# Patient Record
Sex: FEMALE | Race: OTHER | Employment: UNEMPLOYED | URBAN - METROPOLITAN AREA
[De-identification: names, ages, dates, MRNs, and addresses within clinical notes are randomized per-mention and may not be internally consistent; named-entity substitution may affect disease eponyms.]

---

## 2017-04-17 PROCEDURE — 96365 THER/PROPH/DIAG IV INF INIT: CPT

## 2017-04-17 PROCEDURE — 99283 EMERGENCY DEPT VISIT LOW MDM: CPT

## 2017-04-17 PROCEDURE — 81001 URINALYSIS AUTO W/SCOPE: CPT | Performed by: EMERGENCY MEDICINE

## 2017-04-17 PROCEDURE — 96375 TX/PRO/DX INJ NEW DRUG ADDON: CPT

## 2017-04-17 PROCEDURE — 81025 URINE PREGNANCY TEST: CPT

## 2017-04-18 ENCOUNTER — HOSPITAL ENCOUNTER (EMERGENCY)
Age: 22
Discharge: HOME | End: 2017-04-18
Attending: EMERGENCY MEDICINE
Payer: SELF-PAY

## 2017-04-18 ENCOUNTER — APPOINTMENT (OUTPATIENT)
Dept: CT IMAGING | Age: 22
End: 2017-04-18
Attending: PHYSICIAN ASSISTANT
Payer: SELF-PAY

## 2017-04-18 VITALS
RESPIRATION RATE: 16 BRPM | WEIGHT: 191 LBS | HEART RATE: 80 BPM | TEMPERATURE: 98.6 F | OXYGEN SATURATION: 100 % | BODY MASS INDEX: 32.61 KG/M2 | DIASTOLIC BLOOD PRESSURE: 52 MMHG | SYSTOLIC BLOOD PRESSURE: 101 MMHG | HEIGHT: 64 IN

## 2017-04-18 DIAGNOSIS — N23 RENAL COLIC: Primary | ICD-10-CM

## 2017-04-18 DIAGNOSIS — N39.0 URINARY TRACT INFECTION WITHOUT HEMATURIA, SITE UNSPECIFIED: ICD-10-CM

## 2017-04-18 LAB
ALBUMIN SERPL BCP-MCNC: 3.9 G/DL (ref 3.5–5)
ALBUMIN/GLOB SERPL: 1 {RATIO} (ref 1.1–2.2)
ALP SERPL-CCNC: 87 U/L (ref 45–117)
ALT SERPL-CCNC: 30 U/L (ref 12–78)
ANION GAP BLD CALC-SCNC: 8 MMOL/L (ref 5–15)
APPEARANCE UR: ABNORMAL
AST SERPL W P-5'-P-CCNC: 15 U/L (ref 15–37)
BACTERIA URNS QL MICRO: NEGATIVE /HPF
BASOPHILS # BLD AUTO: 0 K/UL (ref 0–0.1)
BASOPHILS # BLD: 0 % (ref 0–1)
BILIRUB SERPL-MCNC: 0.5 MG/DL (ref 0.2–1)
BILIRUB UR QL: NEGATIVE
BUN SERPL-MCNC: 4 MG/DL (ref 6–20)
BUN/CREAT SERPL: 7 (ref 12–20)
CALCIUM SERPL-MCNC: 8.5 MG/DL (ref 8.5–10.1)
CHLORIDE SERPL-SCNC: 104 MMOL/L (ref 97–108)
CO2 SERPL-SCNC: 27 MMOL/L (ref 21–32)
COLOR UR: ABNORMAL
CREAT SERPL-MCNC: 0.61 MG/DL (ref 0.55–1.02)
EOSINOPHIL # BLD: 0.3 K/UL (ref 0–0.4)
EOSINOPHIL NFR BLD: 3 % (ref 0–7)
EPITH CASTS URNS QL MICRO: ABNORMAL /LPF
ERYTHROCYTE [DISTWIDTH] IN BLOOD BY AUTOMATED COUNT: 13.7 % (ref 11.5–14.5)
GLOBULIN SER CALC-MCNC: 4.1 G/DL (ref 2–4)
GLUCOSE SERPL-MCNC: 84 MG/DL (ref 65–100)
GLUCOSE UR STRIP.AUTO-MCNC: NEGATIVE MG/DL
HCG UR QL: NEGATIVE
HCT VFR BLD AUTO: 39.7 % (ref 35–47)
HGB BLD-MCNC: 13.1 G/DL (ref 11.5–16)
HGB UR QL STRIP: NEGATIVE
HYALINE CASTS URNS QL MICRO: ABNORMAL /LPF (ref 0–5)
KETONES UR QL STRIP.AUTO: NEGATIVE MG/DL
LACTATE SERPL-SCNC: 1 MMOL/L (ref 0.4–2)
LEUKOCYTE ESTERASE UR QL STRIP.AUTO: ABNORMAL
LYMPHOCYTES # BLD AUTO: 22 % (ref 12–49)
LYMPHOCYTES # BLD: 2.1 K/UL (ref 0.8–3.5)
MCH RBC QN AUTO: 29.6 PG (ref 26–34)
MCHC RBC AUTO-ENTMCNC: 33 G/DL (ref 30–36.5)
MCV RBC AUTO: 89.6 FL (ref 80–99)
MONOCYTES # BLD: 1 K/UL (ref 0–1)
MONOCYTES NFR BLD AUTO: 11 % (ref 5–13)
NEUTS SEG # BLD: 6 K/UL (ref 1.8–8)
NEUTS SEG NFR BLD AUTO: 64 % (ref 32–75)
NITRITE UR QL STRIP.AUTO: NEGATIVE
PH UR STRIP: 6.5 [PH] (ref 5–8)
PLATELET # BLD AUTO: 250 K/UL (ref 150–400)
POTASSIUM SERPL-SCNC: 3.5 MMOL/L (ref 3.5–5.1)
PROT SERPL-MCNC: 8 G/DL (ref 6.4–8.2)
PROT UR STRIP-MCNC: NEGATIVE MG/DL
RBC # BLD AUTO: 4.43 M/UL (ref 3.8–5.2)
RBC #/AREA URNS HPF: ABNORMAL /HPF (ref 0–5)
SODIUM SERPL-SCNC: 139 MMOL/L (ref 136–145)
SP GR UR REFRACTOMETRY: 1.01 (ref 1–1.03)
UROBILINOGEN UR QL STRIP.AUTO: 0.2 EU/DL (ref 0.2–1)
WBC # BLD AUTO: 9.6 K/UL (ref 3.6–11)
WBC URNS QL MICRO: ABNORMAL /HPF (ref 0–4)

## 2017-04-18 PROCEDURE — 80053 COMPREHEN METABOLIC PANEL: CPT | Performed by: PHYSICIAN ASSISTANT

## 2017-04-18 PROCEDURE — 74011250636 HC RX REV CODE- 250/636: Performed by: PHYSICIAN ASSISTANT

## 2017-04-18 PROCEDURE — 74176 CT ABD & PELVIS W/O CONTRAST: CPT

## 2017-04-18 PROCEDURE — 85025 COMPLETE CBC W/AUTO DIFF WBC: CPT | Performed by: PHYSICIAN ASSISTANT

## 2017-04-18 PROCEDURE — 74011000258 HC RX REV CODE- 258: Performed by: PHYSICIAN ASSISTANT

## 2017-04-18 PROCEDURE — 74011250637 HC RX REV CODE- 250/637: Performed by: PHYSICIAN ASSISTANT

## 2017-04-18 PROCEDURE — 36415 COLL VENOUS BLD VENIPUNCTURE: CPT | Performed by: PHYSICIAN ASSISTANT

## 2017-04-18 PROCEDURE — 83605 ASSAY OF LACTIC ACID: CPT | Performed by: PHYSICIAN ASSISTANT

## 2017-04-18 RX ORDER — NAPROXEN 500 MG/1
500 TABLET ORAL
Qty: 20 TAB | Refills: 0 | Status: SHIPPED | OUTPATIENT
Start: 2017-04-18 | End: 2019-02-04

## 2017-04-18 RX ORDER — TRAMADOL HYDROCHLORIDE 50 MG/1
50 TABLET ORAL
Qty: 12 TAB | Refills: 0 | Status: SHIPPED | OUTPATIENT
Start: 2017-04-18 | End: 2017-04-28

## 2017-04-18 RX ORDER — CEPHALEXIN 500 MG/1
500 CAPSULE ORAL 2 TIMES DAILY
Qty: 14 CAP | Refills: 0 | Status: SHIPPED | OUTPATIENT
Start: 2017-04-18 | End: 2017-04-25

## 2017-04-18 RX ORDER — KETOROLAC TROMETHAMINE 30 MG/ML
30 INJECTION, SOLUTION INTRAMUSCULAR; INTRAVENOUS
Status: COMPLETED | OUTPATIENT
Start: 2017-04-18 | End: 2017-04-18

## 2017-04-18 RX ORDER — HYDROCODONE BITARTRATE AND ACETAMINOPHEN 7.5; 325 MG/1; MG/1
1 TABLET ORAL
Status: COMPLETED | OUTPATIENT
Start: 2017-04-18 | End: 2017-04-18

## 2017-04-18 RX ORDER — ONDANSETRON 2 MG/ML
4 INJECTION INTRAMUSCULAR; INTRAVENOUS
Status: COMPLETED | OUTPATIENT
Start: 2017-04-18 | End: 2017-04-18

## 2017-04-18 RX ADMIN — CEFTRIAXONE 1 G: 1 INJECTION, POWDER, FOR SOLUTION INTRAMUSCULAR; INTRAVENOUS at 01:44

## 2017-04-18 RX ADMIN — SODIUM CHLORIDE 1000 ML: 900 INJECTION, SOLUTION INTRAVENOUS at 01:43

## 2017-04-18 RX ADMIN — ONDANSETRON 4 MG: 2 INJECTION INTRAMUSCULAR; INTRAVENOUS at 01:44

## 2017-04-18 RX ADMIN — HYDROCODONE BITARTRATE AND ACETAMINOPHEN 1 TABLET: 7.5; 325 TABLET ORAL at 02:44

## 2017-04-18 RX ADMIN — KETOROLAC TROMETHAMINE 30 MG: 30 INJECTION, SOLUTION INTRAMUSCULAR at 01:44

## 2017-04-18 NOTE — ED PROVIDER NOTES
HPI Comments: Patient is a 47-year-old female with PMH significant for migraines and PID who presents for low back pain and urinary pressure. She reports that she had urinary pain and hematuria, and was seen by her PCP on 4/4 and given Septra for a UTI. She had some right flank pain but that got better with the antibiotic. Then yesterday her lower back started to hurt and she describes the pain as constant and gradually getting worse. Also reports bloody/white discharge with urination only. Is not on birth control and currently sexually active. LMP 4/3/17  Denies fever, chills, dizziness, N/V/D, CP, palpitations, SOB    Social: non-smoker, drinks alcohol on occasion     Patient is a 19 y.o. female presenting with flank pain and urinary pain. Flank Pain    Pertinent negatives include no chest pain, no fever, no headaches, no abdominal pain and no dysuria. Urinary Pain    Associated symptoms include flank pain (right side). Pertinent negatives include no chills, no nausea, no vomiting, no frequency, no hematuria, no abdominal pain and no back pain. Past Medical History:   Diagnosis Date    Ill-defined condition     PID    Migraine     Migraines     Migraines        History reviewed. No pertinent surgical history. History reviewed. No pertinent family history. Social History     Social History    Marital status:      Spouse name: N/A    Number of children: N/A    Years of education: N/A     Occupational History    Not on file. Social History Main Topics    Smoking status: Never Smoker    Smokeless tobacco: Never Used    Alcohol use No    Drug use: No    Sexual activity: Yes     Partners: Male     Birth control/ protection: Pill     Other Topics Concern    Not on file     Social History Narrative         ALLERGIES: Chicken flavor and Other medication    Review of Systems   Constitutional: Negative. Negative for chills and fever. HENT: Negative for ear discharge.     Eyes: Negative for photophobia, pain, discharge and visual disturbance. Respiratory: Negative for apnea, cough, chest tightness and shortness of breath. Cardiovascular: Negative for chest pain, palpitations and leg swelling. Gastrointestinal: Negative for abdominal distention, abdominal pain, blood in stool, diarrhea, nausea and vomiting. Genitourinary: Positive for flank pain (right side). Negative for difficulty urinating, dysuria, frequency and hematuria. Musculoskeletal: Negative for back pain, gait problem, joint swelling, myalgias and neck pain. Skin: Negative for color change and pallor. Neurological: Negative for dizziness, syncope and headaches. Psychiatric/Behavioral: Negative for behavioral problems and confusion. The patient is not nervous/anxious. Vitals:    04/17/17 2308   BP: 114/73   Pulse: (!) 114   Resp: 16   Temp: 98.6 °F (37 °C)   SpO2: 100%   Weight: 86.6 kg (191 lb)   Height: 5' 4\" (1.626 m)            Physical Exam   Constitutional: She is oriented to person, place, and time. She appears well-developed and well-nourished. She appears distressed (mild). HENT:   Head: Normocephalic and atraumatic. Right Ear: External ear normal.   Left Ear: External ear normal.   Eyes: Conjunctivae and EOM are normal. Pupils are equal, round, and reactive to light. Right eye exhibits no discharge. Left eye exhibits no discharge. Neck: Normal range of motion. Neck supple. Cardiovascular: Normal rate, regular rhythm, normal heart sounds and intact distal pulses. No murmur heard. Pulmonary/Chest: Effort normal and breath sounds normal.   Abdominal: Soft. Bowel sounds are normal. She exhibits no distension. There is generalized tenderness (diffuse tenderness over right side of abdomen ). There is CVA tenderness (on the right side). There is no rebound and no guarding. Musculoskeletal: Normal range of motion. She exhibits no edema or tenderness.    Neurological: She is alert and oriented to person, place, and time. No cranial nerve deficit. Coordination normal.   Skin: Skin is warm and dry. No rash noted. Psychiatric: She has a normal mood and affect. Her behavior is normal. Judgment and thought content normal.   Nursing note and vitals reviewed. MDM  Number of Diagnoses or Management Options  Renal colic:   Urinary tract infection without hematuria, site unspecified:   Diagnosis management comments: 20 yo female with PMH of migraines and PID who presents for low back and right flank pain. Just finished a course of Septra for a UTI, given to her by her PCP. Denies fever, chills, HA, dizziness, CP, SOB, N/V/D    Labs, urine, and CT obtained. IV fluids, IV antibiotics, anti-emetics, and analgesics given. Non-toxic appearing. Vital signs stable. Patient likely passed a kidney stone on CT scan. Feeling better after pain medications. Discharge home with follow-up with PCP and urology. Amount and/or Complexity of Data Reviewed  Clinical lab tests: ordered and reviewed  Tests in the radiology section of CPT®: ordered and reviewed  Discuss the patient with other providers: yes      ED Course       Procedures    Patient has been reassessed. Reviewed labs, medications and radiographics with patient. Patient getting IVPB Rocephin states having mild pain after Toradol injection. Ordered Norco.    Patient reassessed. Symptoms have improved. Feels comfortable being discharged with PO antibiotics, has tolerated PO challenge in ED. Will discharge home with close follow-up. Will return to ER if there is any worsening of symptoms. Patient's results have been reviewed with them. Patient and/or family have verbally conveyed their understanding and agreement of the patient's signs, symptoms, diagnosis, treatment and prognosis and additionally agree to follow up as recommended or return to the Emergency Room should their condition change prior to follow-up.   Discharge instructions have also been provided to the patient with some educational information regarding their diagnosis as well a list of reasons why they would want to return to the ER prior to their follow-up appointment should their condition change. Discussed case with attending Physician Radha Nuñez. Agrees with care and will D/C with follow up.         Doralee Soulier, PA

## 2017-04-18 NOTE — ED TRIAGE NOTES
Since 4/4 I have been having urinary pain and blood in my urine. I saw my PCP and was given a coarse of Septra which I finished. Last week I had pain in my R flank but it went away. Since yesterday the pain in my back has been constant. I still have pressure when I urinate but no blood.

## 2017-04-18 NOTE — DISCHARGE INSTRUCTIONS
Flank Pain: Care Instructions  Your Care Instructions  Flank pain is pain on the side of the back just below the rib cage and above the waist. It can be on one or both sides. Flank pain has many possible causes, including a kidney stone, a urinary tract infection, or back strain. Flank pain may get better on its own. But don't ignore new symptoms, such as fever, nausea and vomiting, urination problems, pain that gets worse, and dizziness. These may be signs of a more serious problem. You may have to have tests or other treatment. Follow-up care is a key part of your treatment and safety. Be sure to make and go to all appointments, and call your doctor if you are having problems. It's also a good idea to know your test results and keep a list of the medicines you take. How can you care for yourself at home? · Rest until you feel better. · Take pain medicines exactly as directed. ¨ If the doctor gave you a prescription medicine for pain, take it as prescribed. ¨ If you are not taking a prescription pain medicine, ask your doctor if you can take an over-the-counter pain medicine, such as acetaminophen (Tylenol), ibuprofen (Advil, Motrin), or naproxen (Aleve). Read and follow all instructions on the label. · If your doctor prescribed antibiotics, take them as directed. Do not stop taking them just because you feel better. You need to take the full course of antibiotics. · To apply heat, put a warm water bottle, a heating pad set on low, or a warm cloth on the painful area. Do not go to sleep with a heating pad on your skin. · To prevent dehydration, drink plenty of fluids, enough so that your urine is light yellow or clear like water. Choose water and other caffeine-free clear liquids until you feel better. If you have kidney, heart, or liver disease and have to limit fluids, talk with your doctor before you increase the amount of fluids you drink. When should you call for help?   Call your doctor now or seek immediate medical care if:  · Your flank pain gets worse. · You have new symptoms, such as fever, nausea, or vomiting. · You have symptoms of a urinary problem. For example:  ¨ You have blood or pus in your urine. ¨ You have chills or body aches. ¨ It hurts to urinate. ¨ You have groin or belly pain. Watch closely for changes in your health, and be sure to contact your doctor if you do not get better as expected. Where can you learn more? Go to http://keshawn-jacky.info/. Enter S191 in the search box to learn more about \"Flank Pain: Care Instructions. \"  Current as of: May 27, 2016  Content Version: 11.2  © 7231-9578 [employer]. Care instructions adapted under license by [employer] (which disclaims liability or warranty for this information). If you have questions about a medical condition or this instruction, always ask your healthcare professional. [place] [address] any warranty or liability for your use of this information. We hope that we have addressed all of your medical concerns. The examination and treatment you received in the Emergency Department were for an emergent problem and were not intended as complete care. It is important that you follow up with your healthcare provider(s) for ongoing care. If your symptoms worsen or do not improve as expected, and you are unable to reach your usual health care provider(s), you should return to the Emergency Department. Today's healthcare is undergoing tremendous change, and patient satisfaction surveys are one of the many tools to assess the quality of medical care. You may receive a survey from the [employer] organization regarding your experience in the Emergency Department. I hope that your experience has been completely positive, particularly the medical care that I provided.   As such, please participate in the survey; anything less than excellent does not meet my expectations or intentions. [facility] and [facility] participate in nationally recognized quality of care measures. If your blood pressure is greater than 120/80, as reported below, we urge that you seek medical care to address the potential of high blood pressure, commonly known as hypertension. Hypertension can be hereditary or can be caused by certain medical conditions, pain, stress, or \"white coat syndrome. \"       Please make an appointment with your health care provider(s) for follow up of your Emergency Department visit. VITALS:   Patient Vitals for the past 8 hrs:   Temp Pulse Resp BP SpO2   04/17/17 2308 98.6 °F (37 °C) (!) 114 16 114/73 100 %          Thank you for allowing us to provide you with medical care today. We realize that you have many choices for your emergency care needs. Please choose us in the future for any continued health care needs. Deedee Thomas  Kary Mason[address]   Office: [phone]            Recent Results (from the past 24 hour(s))   URINALYSIS W/MICROSCOPIC    Collection Time: 04/17/17 11:52 PM   Result Value Ref Range    Color YELLOW/STRAW      Appearance CLOUDY (A) CLEAR      Specific gravity 1.015 1.003 - 1.030      pH (UA) 6.5 5.0 - 8.0      Protein NEGATIVE  NEG mg/dL    Glucose NEGATIVE  NEG mg/dL    Ketone NEGATIVE  NEG mg/dL    Bilirubin NEGATIVE  NEG      Blood NEGATIVE  NEG      Urobilinogen 0.2 0.2 - 1.0 EU/dL    Nitrites NEGATIVE  NEG      Leukocyte Esterase SMALL (A) NEG      WBC 20-50 0 - 4 /hpf    RBC 0-5 0 - 5 /hpf    Epithelial cells MODERATE (A) FEW /lpf    Bacteria NEGATIVE  NEG /hpf    Hyaline cast 2-5 0 - 5 /lpf   HCG URINE, QL. - POC    Collection Time: 04/17/17 11:58 PM   Result Value Ref Range    Pregnancy test,urine (POC) NEGATIVE  NEG     CBC WITH AUTOMATED DIFF    Collection Time: 04/18/17  1:01 AM   Result Value Ref Range    WBC 9.6 3.6 - 11.0 K/uL    RBC 4.43 3.80 - 5.20 M/uL    HGB 13.1 11.5 - 16.0 g/dL    HCT 39.7 35.0 - 47.0 %    MCV 89.6 80.0 - 99.0 FL    MCH 29.6 26.0 - 34.0 PG    MCHC 33.0 30.0 - 36.5 g/dL    RDW 13.7 11.5 - 14.5 %    PLATELET 048 775 - 617 K/uL    NEUTROPHILS 64 32 - 75 %    LYMPHOCYTES 22 12 - 49 %    MONOCYTES 11 5 - 13 %    EOSINOPHILS 3 0 - 7 %    BASOPHILS 0 0 - 1 %    ABS. NEUTROPHILS 6.0 1.8 - 8.0 K/UL    ABS. LYMPHOCYTES 2.1 0.8 - 3.5 K/UL    ABS. MONOCYTES 1.0 0.0 - 1.0 K/UL    ABS. EOSINOPHILS 0.3 0.0 - 0.4 K/UL    ABS. BASOPHILS 0.0 0.0 - 0.1 K/UL   METABOLIC PANEL, COMPREHENSIVE    Collection Time: 04/18/17  1:01 AM   Result Value Ref Range    Sodium 139 136 - 145 mmol/L    Potassium 3.5 3.5 - 5.1 mmol/L    Chloride 104 97 - 108 mmol/L    CO2 27 21 - 32 mmol/L    Anion gap 8 5 - 15 mmol/L    Glucose 84 65 - 100 mg/dL    BUN 4 (L) 6 - 20 MG/DL    Creatinine 0.61 0.55 - 1.02 MG/DL    BUN/Creatinine ratio 7 (L) 12 - 20      GFR est AA >60 >60 ml/min/1.73m2    GFR est non-AA >60 >60 ml/min/1.73m2    Calcium 8.5 8.5 - 10.1 MG/DL    Bilirubin, total 0.5 0.2 - 1.0 MG/DL    ALT (SGPT) 30 12 - 78 U/L    AST (SGOT) 15 15 - 37 U/L    Alk. phosphatase 87 45 - 117 U/L    Protein, total 8.0 6.4 - 8.2 g/dL    Albumin 3.9 3.5 - 5.0 g/dL    Globulin 4.1 (H) 2.0 - 4.0 g/dL    A-G Ratio 1.0 (L) 1.1 - 2.2     LACTIC ACID, PLASMA    Collection Time: 04/18/17  1:01 AM   Result Value Ref Range    Lactic acid 1.0 0.4 - 2.0 MMOL/L       Ct Abd Pelv Wo Cont    Result Date: 4/18/2017  INDICATION: flank pain COMPARISON: December 26, 2016 TECHNIQUE: Noncontrast thin axial images were obtained through the abdomen and pelvis. Coronal and sagittal reconstructions were generated. CT dose reduction was achieved through use of a standardized protocol tailored for this examination and automatic exposure control for dose modulation. Adaptive statistical iterative reconstruction (ASIR) was utilized. FINDINGS: LUNG BASES: No abnormality.  LIVER: No mass or biliary dilatation. GALLBLADDER: Unremarkable. SPLEEN: No enlargement or lesion. PANCREAS: No mass or ductal dilatation. ADRENALS: No mass. KIDNEYS: There is medullary nephrocalcinosis and a punctate nonobstructing calculus of the mid right kidney. Slight prominence of the right ureter, without obstructing calculus identified. GI TRACT:  No bowel obstruction or wall thickening PERITONEUM: No free air or free fluid. APPENDIX: Unremarkable. Milad Ramal RETROPERITONEUM: No lymphadenopathy or aortic aneurysm. REPRODUCTIVE ORGANS: Unremarkable. URINARY BLADDER: No mass or calculus. FREE FLUID:  None. BONES: No destructive bone lesion. ADDITIONAL COMMENTS: N/A. IMPRESSION: Bilateral medullary nephrocalcinosis and punctate nonobstructing right renal calculus. Slight fullness right ureter without obstructing calculus identified, could be related to a recently passed stone.

## 2019-02-04 ENCOUNTER — TELEPHONE (OUTPATIENT)
Dept: OBGYN CLINIC | Age: 24
End: 2019-02-04

## 2019-02-04 ENCOUNTER — OFFICE VISIT (OUTPATIENT)
Dept: OBGYN CLINIC | Age: 24
End: 2019-02-04

## 2019-02-04 VITALS
DIASTOLIC BLOOD PRESSURE: 64 MMHG | HEIGHT: 64 IN | HEART RATE: 66 BPM | WEIGHT: 204.2 LBS | SYSTOLIC BLOOD PRESSURE: 106 MMHG | BODY MASS INDEX: 34.86 KG/M2

## 2019-02-04 DIAGNOSIS — R10.2 FEMALE PELVIC PAIN: Primary | ICD-10-CM

## 2019-02-04 DIAGNOSIS — R87.619 ABNORMAL CERVICAL PAPANICOLAOU SMEAR, UNSPECIFIED ABNORMAL PAP FINDING: ICD-10-CM

## 2019-02-04 PROBLEM — E66.01 SEVERE OBESITY (HCC): Status: ACTIVE | Noted: 2019-02-04

## 2019-02-04 RX ORDER — NORETHINDRONE ACETATE AND ETHINYL ESTRADIOL 1.5-30(21)
1 KIT ORAL DAILY
Qty: 28 TAB | Refills: 13 | Status: SHIPPED | OUTPATIENT
Start: 2019-02-04 | End: 2019-11-05

## 2019-02-04 NOTE — TELEPHONE ENCOUNTER
Patient of [address]. She is calling to say that she was in the office today and was talking with . She said that the options were a laparoscopic procedure versus oral contraceptive pills. She went with the oral contraceptive pills but the more she thinks about it the more she is for the laparoscopic procedure. Patient is interested in laparoscopic procedure now and getting on the schedule.

## 2019-02-04 NOTE — PROGRESS NOTES
Pelvic Pain evaluation    Evita Mendez is a 20 y.o. female who complains of pelvic pain. The pain is described as shooting, and is 6/10 in intensity. Pain is located in the bilateral without radiation. The pain started a few years ago. Her symptoms have been gradually worsening since. Aggravating factors: none. Alleviating factors: none, she has tried 2 different OCPs and Depo Provera in the past..   Associated symptoms: none. The patient denies any other symptoms. Here pain is LLQ and radiates to the RLQ. It occurs for 2 days out of each month. It is not associated with her menses. She has a H/O PID few years ago. She had an \"abnormal pap\" a year ago and no f/u testing. She had Xochitl Ronks placed last year and menses are sporadic now. Also noticed some warts a week ago. UTERUS IS ANTEVERTED, NORMAL IN SIZE AND ECHOGENICITY. ENDOMETRIUM MEASURES 3-4MM IN THICKNESS. NO EVIDENCE OF MASS OR ABNORMALITY SEEN  WITHIN THE ENDOMETRIAL CAVITY. IUD IS SEEN IN THE PROPER POSITION WITHIN THE ENDOMETRIAL CAVITY IN THE UTERINE FUNDUS. RIGHT OVARY APPEARS WITHIN NORMAL LIMITS. LEFT OVARY APPEARS WITHIN NORMAL LIMITS. A FOLLICULAR CYST IS SEEN. NO FREE FLUID SEEN IN THE CDS. Past Medical History:   Diagnosis Date    Ill-defined condition     PID    Migraine     Migraines     Migraines      No past surgical history on file.   Social History     Occupational History    Not on file   Tobacco Use    Smoking status: Never Smoker    Smokeless tobacco: Never Used   Substance and Sexual Activity    Alcohol use: No     Alcohol/week: 0.0 oz    Drug use: No    Sexual activity: Yes     Partners: Male     Birth control/protection: IUD     Family History   Problem Relation Age of Onset    Ovarian Cancer Mother     Hypertension Maternal Grandmother     Diabetes Maternal Grandmother        Allergies   Allergen Reactions    Chicken Flavor Itching    Other Medication Hives     Ham     Prior to Admission medications    Medication Sig Start Date End Date Taking? Authorizing Provider   levonorgestrel Leory Surekha) 17.5 mcg/24 hr (5 years) IUD IUD 1 Each by IntraUTERine route once. Yes Provider, Historical   naproxen (NAPROSYN) 500 mg tablet Take 1 Tab by mouth every twelve (12) hours as needed for Pain.  4/18/17   MANISHA Degroot        Review of Systems: History obtained from the patient  Constitutional: negative for weight loss, fever, night sweats  Breast: negative for breast lumps, nipple discharge, galactorrhea  GI: negative for change in bowel habits, abdominal pain, black or bloody stools  : negative for frequency, dysuria, hematuria, vaginal discharge  MSK: negative for back pain, joint pain, muscle pain  Skin: negative for itching, rash, hives  Psych: negative for anxiety, depression, change in mood      Objective:    Visit Vitals  /64 (BP 1 Location: Left arm, BP Patient Position: Sitting)   Pulse 66   Ht 5' 4\" (1.626 m)   Wt 204 lb 3.2 oz (92.6 kg)   LMP 12/16/2018   BMI 35.05 kg/m²       Physical Exam:     Constitutional  · Appearance: well-nourished, well developed, alert, in no acute distress    Gastrointestinal  · Abdominal Examination: abdomen non-tender to palpation, normal bowel sounds, no masses present  · Liver and spleen: no hepatomegaly present, spleen not palpable  · Hernias: no hernias identified    Genitourinary  · External Genitalia: normal appearance for age, no discharge present, no tenderness present, no inflammatory lesions present, no atrophy present, small condyloma on right anterior labia and at posterior fourchette  · Vagina: normal vaginal vault without central or paravaginal defects, no discharge present, no inflammatory lesions present, no masses present  · Bladder: non-tender to palpation  · Urethra: appears normal  · Cervix: normal, IUD string seen   · Uterus: normal size, shape and consistency  · Adnexa: no adnexal tenderness present, no adnexal masses present  · Perineum: perineum within normal limits, no evidence of trauma, no rashes or skin lesions present  · Anus: anus within normal limits, no hemorrhoids present  · Inguinal Lymph Nodes: no lymphadenopathy present    Skin  · General Inspection: no rash, no lesions identified    Neurologic/Psychiatric  · Mental Status:  · Orientation: grossly oriented to person, place and time  · Mood and Affect: mood normal, affect appropriate    Assessment:  H/O pelvic pain with normal exam and US. H/O abnormal Pap  Vulvar condyloma  H/O renal stones    Plan:   She will try OCPs again. Cervical cultures and Pap sent. Given info on endometriosis and laparoscopy. RTO to treat condyloma with TCA, declines Aldara at home. Check urine for blood next week and consider looking for recurrent renal stones. .  Instructions given to pt. Handouts given to pt.

## 2019-02-07 LAB
C TRACH RRNA SPEC QL NAA+PROBE: POSITIVE
CYTOLOGIST CVX/VAG CYTO: ABNORMAL
CYTOLOGY CVX/VAG DOC THIN PREP: ABNORMAL
CYTOLOGY HISTORY:: ABNORMAL
DX ICD CODE: ABNORMAL
DX ICD CODE: ABNORMAL
HPV I/H RISK 1 DNA CVX QL PROBE+SIG AMP: POSITIVE
Lab: ABNORMAL
N GONORRHOEA RRNA SPEC QL NAA+PROBE: NEGATIVE
OTHER STN SPEC: ABNORMAL
PATH REPORT.FINAL DX SPEC: ABNORMAL
PATHOLOGIST CVX/VAG CYTO: ABNORMAL
RECOM F/U CVX/VAG CYTO: ABNORMAL
STAT OF ADQ CVX/VAG CYTO-IMP: ABNORMAL
T VAGINALIS RRNA VAG QL NAA+PROBE: NEGATIVE

## 2019-02-07 NOTE — PROGRESS NOTES
Her cultures are positive for Chlamydia and her Pap is abnormal. She needs to take Zithromax 1 gram and then 2 weeks later have a colposcopy for her abnormal Pap. She should not schedule her laparoscopy until after the chlamydia and Pap are treated.

## 2019-02-08 RX ORDER — AZITHROMYCIN 500 MG/1
1000 TABLET, FILM COATED ORAL DAILY
Qty: 2 TAB | Refills: 0 | Status: SHIPPED | OUTPATIENT
Start: 2019-02-08 | End: 2019-02-09

## 2019-02-08 NOTE — TELEPHONE ENCOUNTER
Patient has been advised of lab results. She is aware that chlamydia has to be treated first.  She will alert her partner(s) for them to get treatment too and no intercourse for risk of reinfection until treated x 2 weeks. Advised patient to discuss with  upon visit his suggestion to test for cure. We discussed the ASCUS and HPV high risk with colposcopy.   She has been placed on the schedule with .  2/25/19  9:10 am    RX to be sent to [facility] ([facility]) [facility]

## 2019-02-08 NOTE — TELEPHONE ENCOUNTER
She needs to have her chlamydia treated first and then have a colposcopy and then her surgery can be scheduled

## 2019-02-13 ENCOUNTER — TELEPHONE (OUTPATIENT)
Dept: OBGYN CLINIC | Age: 24
End: 2019-02-13

## 2019-02-13 RX ORDER — METRONIDAZOLE 500 MG/1
500 TABLET ORAL 2 TIMES DAILY
Qty: 14 TAB | Refills: 0 | Status: SHIPPED | OUTPATIENT
Start: 2019-02-13 | End: 2019-02-20

## 2019-02-13 NOTE — TELEPHONE ENCOUNTER
Patient of [address]. She is calling to say that she tested positive for chlamydia and was treated with Zithromax as directed on 2/8/19. She said that she still continues to have copious amounts of white/yellow tinted vaginal discharge with pelvic pain that is relieved by Ibuprofen. She said that she is having to change a light days pad three times a day from being soaked. She has a colpo scheduled on 2/25/19 and wants to know if she should be sooner for evaluation for the discharge or if she should post pone the colpo? Per Dr. Milad Oshea, he said that he is uncertain that the discharge has any correlation with the chlamydia, certainly the pelvic pain can. Offer her Flagyl 500 mg bid x 7 days if interested to see if this helps.   She should still keep her appt for colposcopy, he just did not want to do the colpo unless treated with Zithromax first.         [facility])

## 2019-02-13 NOTE — TELEPHONE ENCOUNTER
Patient was advised of [address] advice. She does want to try the Flagyl. Advised to avoid alcohol consumption. Advised okay to pursue colpo. rx sent and   Receipt confirmed by pharmacy.

## 2019-02-25 ENCOUNTER — OFFICE VISIT (OUTPATIENT)
Dept: OBGYN CLINIC | Age: 24
End: 2019-02-25

## 2019-02-25 VITALS
DIASTOLIC BLOOD PRESSURE: 64 MMHG | HEART RATE: 69 BPM | SYSTOLIC BLOOD PRESSURE: 107 MMHG | HEIGHT: 64 IN | BODY MASS INDEX: 35.17 KG/M2 | WEIGHT: 206 LBS

## 2019-02-25 DIAGNOSIS — R87.810 ASCUS WITH POSITIVE HIGH RISK HPV CERVICAL: Primary | ICD-10-CM

## 2019-02-25 DIAGNOSIS — R87.610 ASCUS WITH POSITIVE HIGH RISK HPV CERVICAL: Primary | ICD-10-CM

## 2019-02-25 LAB
HCG URINE, QL. (POC): NEGATIVE
VALID INTERNAL CONTROL?: YES

## 2019-02-25 NOTE — PROGRESS NOTES
[facility]  OFFICE PROCEDURE PROGRESS NOTE        Chart reviewed for the following:   Andre DESAI Memo, have reviewed the History, Physical and updated the Allergic reactions for Arabella Godinez     TIME OUT performed immediately prior to start of procedure:   Andre DESAI, have performed the following reviews on Arabella Godinez prior to the start of the procedure:            * Patient was identified by name and date of birth   * Agreement on procedure being performed was verified  * Risks and Benefits explained to the patient  * Consent was signed and verified     Time: 9:35am      Date of procedure: 2/25/2019    Procedure performed by: Ilana Cordero MD    Provider assisted by: Carter Rosales    Patient assisted by: mother    How tolerated by patient: tolerated the procedure well with no complications    Post Procedural Pain Scale: 2 - Hurts Little Bit    Comments: none      Procedure Note: Colposcopy    Marianna Ghosh is a G3 ,  20 y.o. female OTHER whose No LMP recorded. was on . She presents for colposcopy for evaluation of a cervical abnormality with a pap smear abnormality consisting of ASCUS and + HPV. After being presented with the risks, benefits and alternatives has she signed a consent for the procedure. She states that she understands the need for the procedure and has no further questions. She was informed that she may experience discomfort. Procedure:  She was positioned in the dorsal lithotomy position and a speculum was inserted into the vagina. Dilute acetic acid was applied to the cervix. The colposcope was used to visualize the cervix. Procedure: The transformation zone was completely visualized. Findings: This colposcopy was satisfactory. Biopsies were taken from the cervix, placed in formalin, and sent to pathology. See accompanying diagram for biopsy sites. Monsels was applied to the cervix. Endocervical currettage: An endocervical curettage was performed. Findings:The procedure was notable for white epithelium on the cervix. Post Procedure Status: The patient tolerated the procedure well with minimal discomfort.

## 2019-02-27 LAB
DX ICD CODE: NORMAL
DX ICD CODE: NORMAL
PATH REPORT.FINAL DX SPEC: NORMAL
PATH REPORT.GROSS SPEC: NORMAL
PATH REPORT.RELEVANT HX SPEC: NORMAL
PATH REPORT.SITE OF ORIGIN SPEC: NORMAL
PATHOLOGIST NAME: NORMAL
PAYMENT PROCEDURE: NORMAL

## 2019-03-11 ENCOUNTER — OFFICE VISIT (OUTPATIENT)
Dept: OBGYN CLINIC | Age: 24
End: 2019-03-11

## 2019-03-11 VITALS
BODY MASS INDEX: 35.71 KG/M2 | WEIGHT: 209.2 LBS | HEART RATE: 74 BPM | SYSTOLIC BLOOD PRESSURE: 96 MMHG | DIASTOLIC BLOOD PRESSURE: 62 MMHG | HEIGHT: 64 IN

## 2019-03-11 DIAGNOSIS — A74.9 CHLAMYDIA: Primary | ICD-10-CM

## 2019-03-11 NOTE — PROGRESS NOTES
Chief Complaint   Exposure to STD      HPI  Daniel Mobley is a 20 y.o. female who presents for the evaluation of possible STI. She was diagnosed with Chlamydia 6 weeks ago and is here for [facility] prior to scheduling surgery for chronic pain. She denies  symptoms at this time. The patient  has risk factors for sexually-transmitted infection. She has a history of having unprotected intercourse. Previous STD history: chlamydia    Past Medical History:   Diagnosis Date    Chlamydia     Ill-defined condition     PID    Migraines      History reviewed. No pertinent surgical history. Social History     Occupational History    Not on file   Tobacco Use    Smoking status: Never Smoker    Smokeless tobacco: Never Used   Substance and Sexual Activity    Alcohol use: No     Alcohol/week: 0.0 oz    Drug use: No    Sexual activity: Yes     Partners: Male     Birth control/protection: IUD     Family History   Problem Relation Age of Onset    Ovarian Cancer Mother     Hypertension Maternal Grandmother     Diabetes Maternal Grandmother        Allergies   Allergen Reactions    Chicken Flavor Itching    Other Medication Hives     Ham     Prior to Admission medications    Medication Sig Start Date End Date Taking? Authorizing Provider   levonorgestrel Marlan Mayans) 17.5 mcg/24 hr (5 years) IUD IUD 1 Each by IntraUTERine route once. Yes Provider, Historical   norethindrone-ethinyl estradiol-iron (LOESTRIN FE 1.5/30, 28-DAY,) 1.5 mg-30 mcg (21)/75 mg (7) tab Take 1 Tab by mouth daily.  2/4/19  Yes Janina Hollingsworth MD        Review of Systems: History obtained from the patient  Constitutional: negative for weight loss, fever, night sweats  Breast: negative for breast lumps, nipple discharge, galactorrhea  GI: negative for change in bowel habits, abdominal pain, black or bloody stools  : negative for frequency, dysuria, hematuria, vaginal discharge  MSK: negative for back pain, joint pain, muscle pain  Skin: negative for itching, rash, hives  Psych: negative for anxiety, depression, change in mood    Objective:  Visit Vitals  BP 96/62 (BP 1 Location: Left arm, BP Patient Position: Sitting)   Pulse 74   Ht 5' 4\" (1.626 m)   Wt 209 lb 3.2 oz (94.9 kg)   BMI 35.91 kg/m²       PHYSICAL EXAMINATION    Constitutional  · Appearance: well-nourished, well developed, alert, in no acute distress    Gastrointestinal  · Abdominal Examination: abdomen non-tender to palpation, normal bowel sounds, no masses present  · Liver and spleen: no hepatomegaly present, spleen not palpable  · Hernias: no hernias identified    Genitourinary  · External Genitalia: normal appearance for age, no discharge present, no tenderness present, no inflammatory lesions present, no masses present, no atrophy present  · Vagina: normal vaginal vault without central or paravaginal defects, no discharge present, no inflammatory lesions present, no masses present  · Bladder: non-tender to palpation  · Urethra: appears normal  · Cervix: normal   · Uterus: normal size, shape and consistency  · Adnexa: no adnexal tenderness present, no adnexal masses present  · Perineum: perineum within normal limits, no evidence of trauma, no rashes or skin lesions present  · Anus: anus within normal limits, no hemorrhoids present  · Inguinal Lymph Nodes: no lymphadenopathy present    Skin  · General Inspection: no rash, no lesions identified    Neurologic/Psychiatric  · Mental Status:  · Orientation: grossly oriented to person, place and time  · Mood and Affect: mood normal, affect appropriate          Assesment:   H/O chlamydia s/p treatment    Plan:   GC and chlamydia DNA  probe sent to lab. She has chronic pelvic pain and wants a laparoscopy to r/o endometriosis. Offerred a trial of Robinson Ramos and she declines. Advised of risks of surgery and all her questions were answered.

## 2019-03-13 ENCOUNTER — TELEPHONE (OUTPATIENT)
Dept: OBGYN CLINIC | Age: 24
End: 2019-03-13

## 2019-03-13 DIAGNOSIS — R10.2 PELVIC PAIN IN FEMALE: Primary | ICD-10-CM

## 2019-03-13 LAB
C TRACH RRNA SPEC QL NAA+PROBE: POSITIVE
N GONORRHOEA RRNA SPEC QL NAA+PROBE: NEGATIVE

## 2019-03-13 RX ORDER — AZITHROMYCIN 500 MG/1
TABLET, FILM COATED ORAL
Qty: 2 TAB | Refills: 0 | Status: SHIPPED | OUTPATIENT
Start: 2019-03-13 | End: 2019-05-11

## 2019-03-13 NOTE — TELEPHONE ENCOUNTER
Patient advised of MD reviewed results and recommendations. MD Katerin Mann RN             Her chlamydia test is still positive. She needs to take Zithromax 1 gram po x1 again and then needs test of cure in 4 weeks. Her partner needs to be treated as well. She should not have intercourse until both she and her partner are treated and both have negative follow up testing. We cannot do her diagnostic laparoscopy surgery until her chlamydia has cleared and we will cancel her scheduled surgery on April 3rd for now. Prescription sent as per MD order to patient preferred pharmacy. Patient verbalized understanding of need for treatment and for partner and to return to our office in 4 weeks for TOM OWUSU and that her surgery would need to be cancelled till she came back negative for the chlamydia. Patient verbalized understanding.

## 2019-03-13 NOTE — PROGRESS NOTES
Patient advised of MD reviewed lab and recommendations and prescription sent to patient preferred pharmacy.  Please see telephone encounter

## 2019-03-13 NOTE — PROGRESS NOTES
Her chlamydia test is still positive. She needs to take Zithromax 1 gram po x1 again and then needs test of cure in 4 weeks. Her partner needs to be treated as well. She should not have intercourse until both she and her partner are treated and both have negative follow up testing. We cannot do her diagnostic laparoscopy surgery until her chlamydia has cleared and we will cancel her scheduled surgery on April 3rd for now.

## 2019-04-15 ENCOUNTER — TELEPHONE (OUTPATIENT)
Dept: OBGYN CLINIC | Age: 24
End: 2019-04-15

## 2019-04-15 NOTE — TELEPHONE ENCOUNTER
Patient of [address] 73. Took Zithromax this morning for chlamydia test that returned as positive. She ate a tortilla and salad, drank some milk before taking it but it still upset her stomach and caused a little diarrhea. No rash or difficulty breathing. Discussed antibiotics can cause stomach sensitivity. Try to eat some bread or crackers, milk. Unfortunately side effect of medication that can happen, no allergic reaction. Call prn.

## 2019-05-11 ENCOUNTER — HOSPITAL ENCOUNTER (EMERGENCY)
Age: 24
Discharge: HOME | End: 2019-05-11
Attending: EMERGENCY MEDICINE
Payer: SELF-PAY

## 2019-05-11 VITALS
WEIGHT: 185 LBS | HEART RATE: 79 BPM | RESPIRATION RATE: 16 BRPM | SYSTOLIC BLOOD PRESSURE: 132 MMHG | HEIGHT: 65 IN | BODY MASS INDEX: 30.82 KG/M2 | DIASTOLIC BLOOD PRESSURE: 84 MMHG | TEMPERATURE: 98.1 F | OXYGEN SATURATION: 100 %

## 2019-05-11 DIAGNOSIS — M25.561 CHRONIC PAIN OF RIGHT KNEE: Primary | ICD-10-CM

## 2019-05-11 DIAGNOSIS — G89.29 CHRONIC PAIN OF RIGHT KNEE: Primary | ICD-10-CM

## 2019-05-11 PROCEDURE — L1830 KO IMMOB CANVAS LONG PRE OTS: HCPCS

## 2019-05-11 PROCEDURE — 74011250637 HC RX REV CODE- 250/637: Performed by: EMERGENCY MEDICINE

## 2019-05-11 PROCEDURE — 99283 EMERGENCY DEPT VISIT LOW MDM: CPT

## 2019-05-11 RX ORDER — HYDROCODONE BITARTRATE AND ACETAMINOPHEN 7.5; 325 MG/1; MG/1
1 TABLET ORAL
Status: COMPLETED | OUTPATIENT
Start: 2019-05-11 | End: 2019-05-11

## 2019-05-11 RX ORDER — HYDROCODONE BITARTRATE AND ACETAMINOPHEN 7.5; 325 MG/1; MG/1
1 TABLET ORAL
Qty: 20 TAB | Refills: 0 | Status: SHIPPED | OUTPATIENT
Start: 2019-05-11 | End: 2019-05-18

## 2019-05-11 RX ADMIN — HYDROCODONE BITARTRATE AND ACETAMINOPHEN 1 TABLET: 7.5; 325 TABLET ORAL at 13:11

## 2019-05-11 NOTE — ED PROVIDER NOTES
30 y.o. female with past medical history significant for migraine and chlamydia who presents from private vehicle with chief complaint of knee pain. Pt reports right knee pain for 1 week. Pt notes accompanying right knee swelling. Pt rates her knee pain a \"7/10\". Pt reports her knee pain is aggravated by ambulation. Pt notes she is taking ibuprofen and tylenol at home. Pt states she is applying ice four times a day. Pt reports a knee injury that occured six months ago. Pt denies new injury. There are no other acute medical concerns at this time. PCP: Ruby Roper MD 
 
Note written by Jessenia Erwin, as dictated by Carmencita Gonzalez MD 12:41 PM 
 
 
The history is provided by the patient. No  was used. Past Medical History:  
Diagnosis Date  Chlamydia  Ill-defined condition PID  Migraines No past surgical history on file. Family History:  
Problem Relation Age of Onset  Ovarian Cancer Mother  Hypertension Maternal Grandmother  Diabetes Maternal Grandmother Social History Socioeconomic History  Marital status:  Spouse name: Not on file  Number of children: Not on file  Years of education: Not on file  Highest education level: Not on file Occupational History  Not on file Social Needs  Financial resource strain: Not on file  Food insecurity:  
  Worry: Not on file Inability: Not on file  Transportation needs:  
  Medical: Not on file Non-medical: Not on file Tobacco Use  Smoking status: Never Smoker  Smokeless tobacco: Never Used Substance and Sexual Activity  Alcohol use: Yes Alcohol/week: 1.8 - 2.4 oz Types: 3 - 4 Shots of liquor per week  Drug use: No  
 Sexual activity: Yes  
  Partners: Male Birth control/protection: IUD Lifestyle  Physical activity:  
  Days per week: Not on file Minutes per session: Not on file  Stress: Not on file Relationships  Social connections:  
  Talks on phone: Not on file Gets together: Not on file Attends Sikh service: Not on file Active member of club or organization: Not on file Attends meetings of clubs or organizations: Not on file Relationship status: Not on file  Intimate partner violence:  
  Fear of current or ex partner: Not on file Emotionally abused: Not on file Physically abused: Not on file Forced sexual activity: Not on file Other Topics Concern  Not on file Social History Narrative  Not on file ALLERGIES: Chicken flavor and Other medication Review of Systems Constitutional: Negative for chills and fever. Respiratory: Negative for shortness of breath. Cardiovascular: Positive for leg swelling. Negative for chest pain. Musculoskeletal: Positive for joint swelling and myalgias. Negative for back pain and neck pain. Skin: Negative for rash and wound. There were no vitals filed for this visit. Physical Exam  
Constitutional: She is oriented to person, place, and time. She appears well-developed. HENT:  
Head: Normocephalic and atraumatic. Pulmonary/Chest: Effort normal. No respiratory distress. Musculoskeletal: She exhibits tenderness. She exhibits no deformity. Right knee: She exhibits swelling. She exhibits no deformity. Neurological: She is alert and oriented to person, place, and time. She exhibits normal muscle tone. Coordination normal.  
Limps when she walks due to knee pain Psychiatric: She has a normal mood and affect. Nursing note and vitals reviewed. MDM Number of Diagnoses or Management Options Chronic pain of right knee:  
Diagnosis management comments: Right knee pain - chronic - worsening. Give meds and place in knee immobilizer with crutches and refer to ortho Splint, Long Leg Date/Time: 5/11/2019 1:12 PM 
Performed by: Gris Urban MD 
 Authorized by: Charbel Cervantes MD  
 
Consent:  
  Consent obtained:  Verbal 
  Consent given by:  Patient Risks discussed:  Pain Alternatives discussed:  Referral 
Pre-procedure details:  
  Sensation:  Normal 
Procedure details:  
  Laterality:  Right Location:  Knee Knee:  R knee Splint type:  Knee immobilizer Supplies:  Prefabricated splint Post-procedure details:  
  Pain:  Improved Sensation:  Normal 
  Patient tolerance of procedure: Tolerated well, no immediate complications

## 2019-05-11 NOTE — ED TRIAGE NOTES
Knee pain since last Saturday, some relief with ibuprofen, tylenol and ice 4 times day but then yesterday started swelling and limiting mobility right knee.

## 2019-05-11 NOTE — DISCHARGE INSTRUCTIONS

## 2019-05-22 ENCOUNTER — OFFICE VISIT (OUTPATIENT)
Dept: OBGYN CLINIC | Age: 24
End: 2019-05-22

## 2019-05-22 VITALS — RESPIRATION RATE: 19 BRPM | BODY MASS INDEX: 30.82 KG/M2 | HEIGHT: 65 IN | WEIGHT: 185 LBS

## 2019-05-22 DIAGNOSIS — Z20.2 CHLAMYDIA CONTACT, TREATED: Primary | ICD-10-CM

## 2019-05-22 DIAGNOSIS — A63.0 CONDYLOMA ACUMINATA: ICD-10-CM

## 2019-05-22 NOTE — PROGRESS NOTES
Chief Complaint   Follow-up      HPI  Rox Smith is a 30 y.o. female who presents for the evaluation of possible STI. No LMP recorded. (Menstrual status: IUD). She has vulvar condyloma and wants them treated. She also needs a TYREL from being treated for chlamydia on April 15th. Past Medical History:   Diagnosis Date    Chlamydia     Ill-defined condition     PID    Migraines      History reviewed. No pertinent surgical history. Social History     Occupational History    Not on file   Tobacco Use    Smoking status: Never Smoker    Smokeless tobacco: Never Used   Substance and Sexual Activity    Alcohol use: Yes     Alcohol/week: 1.8 - 2.4 oz     Types: 3 - 4 Shots of liquor per week    Drug use: No    Sexual activity: Yes     Partners: Male     Birth control/protection: IUD     Family History   Problem Relation Age of Onset    Ovarian Cancer Mother     Hypertension Maternal Grandmother     Diabetes Maternal Grandmother        Allergies   Allergen Reactions    Chicken Flavor Itching    Other Medication Hives     Ham     Prior to Admission medications    Medication Sig Start Date End Date Taking? Authorizing Provider   acetaminophen (TYLENOL EXTRA STRENGTH) 500 mg tablet Take 500 mg by mouth as needed for Pain. Provider, Historical   multivitamin (ONE A DAY) tablet Take 1 Tab by mouth daily. Provider, Historical   levonorgestrel (KYLEENA) 17.5 mcg/24 hr (5 years) IUD IUD 1 Each by IntraUTERine route once. Provider, Historical   norethindrone-ethinyl estradiol-iron (LOESTRIN FE 1.5/30, 28-DAY,) 1.5 mg-30 mcg (21)/75 mg (7) tab Take 1 Tab by mouth daily.  2/4/19   Myah Garsia MD        Review of Systems: History obtained from the patient  Constitutional: negative for weight loss, fever, night sweats  Breast: negative for breast lumps, nipple discharge, galactorrhea  GI: negative for change in bowel habits, abdominal pain, black or bloody stools  : negative for frequency, dysuria, hematuria, vaginal discharge  MSK: negative for back pain, joint pain, muscle pain  Skin: negative for itching, rash, hives  Psych: negative for anxiety, depression, change in mood    Objective:  Visit Vitals  Resp 19   Ht 5' 5\" (1.651 m)   Wt 185 lb (83.9 kg)   BMI 30.79 kg/m²       PHYSICAL EXAMINATION    Constitutional  · Appearance: well-nourished, well developed, alert, in no acute distress    Gastrointestinal  · Abdominal Examination: abdomen non-tender to palpation, normal bowel sounds, no masses present  · Liver and spleen: no hepatomegaly present, spleen not palpable  · Hernias: no hernias identified    Genitourinary  · External Genitalia:2 small condyloma on right anterior labia majora and 1 small one a 12 oclock on labia  · Vagina: normal vaginal vault without central or paravaginal defects, bloody discharge present, no inflammatory lesions present, no masses present  · Bladder: non-tender to palpation  · Urethra: appears normal  · Cervix: normal   · Uterus: normal size, shape and consistency  · Adnexa: no adnexal tenderness present, no adnexal masses present  · Perineum: perineum within normal limits, no evidence of trauma, no rashes or skin lesions present  · Anus: anus within normal limits, no hemorrhoids present  · Inguinal Lymph Nodes: no lymphadenopathy present    Skin  · General Inspection: no rash, no lesions identified    Neurologic/Psychiatric  · Mental Status:  · Orientation: grossly oriented to person, place and time  · Mood and Affect: mood normal, affect appropriate          Assesment:   Condyloma  H/O chlamydia s/p treatment    Plan:   GC and chlamydia DNA  probe sent to lab. TCA applied to condyloma  She is still having chronic pelvic pain and wants to proceed with diagnostic laparoscopy. Procedure note: Vulvar chemical treatment of Condylomata    Indications:  Lima Benjamin is a 30 y.o. female OTHER that was found to have condyloma of the vulva.  She has several lesions measuring approximately between 3 mm and 5 mm consistent with condyloma accuminata and not suspicious for malignancy. They are located on the right upper labia majora and at 12 oclock  After being presented with the risks, benefits and specific details of the procedure, informed consent was obtained and signed and she had no further questions. Procedure: The patient was placed on the table in a dorsal lithotomy position and draped in the appropriate manner. The area was inspected and all lesions identified and treated with bi-chloroacetic acid. The patient tolerated the procedure well. There were no complications.

## 2019-05-28 ENCOUNTER — TELEPHONE (OUTPATIENT)
Dept: OBGYN CLINIC | Age: 24
End: 2019-05-28

## 2019-05-28 LAB
C TRACH RRNA SPEC QL NAA+PROBE: POSITIVE
N GONORRHOEA RRNA SPEC QL NAA+PROBE: NEGATIVE

## 2019-05-28 RX ORDER — AZITHROMYCIN 500 MG/1
TABLET, FILM COATED ORAL
Qty: 2 TAB | Refills: 0 | Status: SHIPPED | OUTPATIENT
Start: 2019-05-28 | End: 2019-11-05

## 2019-05-28 RX ORDER — DOXYCYCLINE 100 MG/1
TABLET ORAL
Qty: 14 TAB | Refills: 0 | Status: SHIPPED | OUTPATIENT
Start: 2019-05-28 | End: 2019-11-05

## 2019-05-28 NOTE — TELEPHONE ENCOUNTER
Patient advised of MD recommendations and prescriptions sent to patient preferred pharmacy. Patient placed on the schedule at 8:30am for a nurse to watch her take the pills. Patient verbalized understanding.

## 2019-05-28 NOTE — TELEPHONE ENCOUNTER
Patient advised of MD recommendations . This nurse still has a question for MD.     Prescriptions are pending.

## 2019-05-28 NOTE — TELEPHONE ENCOUNTER
Call received at 803am    30year old patient last seen in the office on 5/22/19. Patient calling to check on her labs.       Please review and advise

## 2019-07-26 ENCOUNTER — HOSPITAL ENCOUNTER (EMERGENCY)
Age: 24
Discharge: HOME | End: 2019-07-26
Attending: EMERGENCY MEDICINE
Payer: SELF-PAY

## 2019-07-26 ENCOUNTER — APPOINTMENT (OUTPATIENT)
Dept: GENERAL RADIOLOGY | Age: 24
End: 2019-07-26
Attending: PHYSICIAN ASSISTANT
Payer: SELF-PAY

## 2019-07-26 VITALS
HEIGHT: 64 IN | DIASTOLIC BLOOD PRESSURE: 83 MMHG | RESPIRATION RATE: 16 BRPM | BODY MASS INDEX: 34.15 KG/M2 | WEIGHT: 200 LBS | TEMPERATURE: 98.2 F | HEART RATE: 76 BPM | SYSTOLIC BLOOD PRESSURE: 124 MMHG | OXYGEN SATURATION: 99 %

## 2019-07-26 DIAGNOSIS — M25.572 ACUTE LEFT ANKLE PAIN: Primary | ICD-10-CM

## 2019-07-26 PROCEDURE — 73610 X-RAY EXAM OF ANKLE: CPT

## 2019-07-26 PROCEDURE — 99282 EMERGENCY DEPT VISIT SF MDM: CPT

## 2019-07-26 NOTE — ED PROVIDER NOTES
57-year-old female presenting ambulatory to the emergency department with complaint persistent left ankle pain and swelling for the past 3 weeks. Patient reports twisting her ankle while wearing high heels. She reports immediate swelling and discoloration. She applied ice, icy hot and has taken ibuprofen with limited relief. Creig Hayden is exacerbated with ambulating. She has scheduled an appointment with her primary care doctor that referred her to the emergency department for an x-ray. She reports intermittent tingling into the toes. She denies any prior history of fractures or surgery to the lower extremity. She denies any other acute medical complaints. She specifically denies fever, headache, dizziness, cough, runny nose, sore throat, chest pain, shortness of breath, abdominal pain, nausea, vomiting, diarrhea, dysuria, urinary frequency or urgency. Past medical history: None  Past surgical history: None  Allergies: Multiple food  Denies alcohol or drug use. Denies tobacco use. Past Medical History:   Diagnosis Date    Chlamydia     Ill-defined condition     PID    Migraines        No past surgical history on file. Family History:   Problem Relation Age of Onset    Ovarian Cancer Mother     Hypertension Maternal Grandmother     Diabetes Maternal Grandmother        Social History     Socioeconomic History    Marital status:      Spouse name: Not on file    Number of children: Not on file    Years of education: Not on file    Highest education level: Not on file   Occupational History    Not on file   Social Needs    Financial resource strain: Not on file    Food insecurity:     Worry: Not on file     Inability: Not on file    Transportation needs:     Medical: Not on file     Non-medical: Not on file   Tobacco Use    Smoking status: Never Smoker    Smokeless tobacco: Never Used   Substance and Sexual Activity    Alcohol use:  Yes     Alcohol/week: 3.0 - 4.0 standard drinks     Types: 3 - 4 Shots of liquor per week    Drug use: No    Sexual activity: Yes     Partners: Male     Birth control/protection: IUD   Lifestyle    Physical activity:     Days per week: Not on file     Minutes per session: Not on file    Stress: Not on file   Relationships    Social connections:     Talks on phone: Not on file     Gets together: Not on file     Attends Christian service: Not on file     Active member of club or organization: Not on file     Attends meetings of clubs or organizations: Not on file     Relationship status: Not on file    Intimate partner violence:     Fear of current or ex partner: Not on file     Emotionally abused: Not on file     Physically abused: Not on file     Forced sexual activity: Not on file   Other Topics Concern    Not on file   Social History Narrative    Not on file         ALLERGIES: Chicken flavor and Other medication    Review of Systems   Constitutional: Negative. Negative for chills and fever. HENT: Negative. Negative for congestion, ear pain, rhinorrhea, sneezing and sore throat. Respiratory: Negative for cough and shortness of breath. Cardiovascular: Negative. Negative for chest pain. Gastrointestinal: Negative. Negative for abdominal pain, constipation, diarrhea, nausea and vomiting. Genitourinary: Negative for difficulty urinating, frequency and urgency. Musculoskeletal: Positive for arthralgias (left ankle) and joint swelling. Neurological: Negative for headaches. All other systems reviewed and are negative. There were no vitals filed for this visit. Physical Exam   Constitutional: She is oriented to person, place, and time. She appears well-developed and well-nourished. No distress. Well appearing adult female in NAD   HENT:   Head: Normocephalic and atraumatic.    Right Ear: External ear normal.   Left Ear: External ear normal.   Nose: Nose normal.   Eyes: Conjunctivae are normal.   Cardiovascular: Normal rate, regular rhythm and normal heart sounds. Pulmonary/Chest: Effort normal. No respiratory distress. She has no wheezes. Abdominal: Soft. Bowel sounds are normal. She exhibits no distension. There is no tenderness. There is no rebound. Musculoskeletal: Normal range of motion. Left ankle: She exhibits swelling. She exhibits normal range of motion, no deformity, no laceration and normal pulse. Tenderness. Lateral malleolus tenderness found. No posterior TFL, no head of 5th metatarsal and no proximal fibula tenderness found. Neurological: She is alert and oriented to person, place, and time. Skin: Skin is warm and dry. No ecchymosis, no laceration and no lesion noted. Nursing note and vitals reviewed. MDM  Number of Diagnoses or Management Options  Diagnosis management comments: 41-year-old female presenting with complaint of persistent left lateral ankle pain and swelling following a twisting injury 3 weeks ago. Differential diagnosis includes fracture versus strain versus sprain. Patient is neurovascularly intact on exam.  No gross deformity appreciated. Will evaluate with x-ray of the left ankle. Amount and/or Complexity of Data Reviewed  Tests in the radiology section of CPT®: ordered and reviewed  Independent visualization of images, tracings, or specimens: yes           Procedures        Imaging reviewed. Patient provided crutches. Patient has appointment scheduled with orthopedics within the next 2 weeks. April C [place], [place]      Patient's results have been reviewed with them. Patient and/or family have verbally conveyed their understanding and agreement of the patient's signs, symptoms, diagnosis, treatment and prognosis and additionally agree to follow up as recommended or return to the Emergency Room should their condition change prior to follow-up.   Discharge instructions have also been provided to the patient with some educational information regarding their diagnosis as well a list of reasons why they would want to return to the ER prior to their follow-up appointment should their condition change.  Lobito Smyth

## 2019-07-26 NOTE — DISCHARGE INSTRUCTIONS
Patient Education      Continue to apply ice, compression and elevate the ankle. Follow-up with the orthopedist as planned. Naprosyn twice daily for pain. Ankle Sprain: Care Instructions  Your Care Instructions    An ankle sprain can happen when you twist your ankle. The ligaments that support the ankle can get stretched and torn. Often the ankle is swollen and painful. Ankle sprains may take from several weeks to several months to heal. Usually, the more pain and swelling you have, the more severe your ankle sprain is and the longer it will take to heal. You can heal faster and regain strength in your ankle with good home treatment. It is very important to give your ankle time to heal completely, so that you do not easily hurt your ankle again. Follow-up care is a key part of your treatment and safety. Be sure to make and go to all appointments, and call your doctor if you are having problems. It's also a good idea to know your test results and keep a list of the medicines you take. How can you care for yourself at home? · Prop up your foot on pillows as much as possible for the next 3 days. Try to keep your ankle above the level of your heart. This will help reduce the swelling. · Follow your doctor's directions for wearing a splint or elastic bandage. Wrapping the ankle may help reduce or prevent swelling. · Your doctor may give you a splint, a brace, an air stirrup, or another form of ankle support to protect your ankle until it is healed. Wear it as directed while your ankle is healing. Do not remove it unless your doctor tells you to. After your ankle has healed, ask your doctor whether you should wear the brace when you exercise. · Put ice or cold packs on your injured ankle for 10 to 20 minutes at a time. Try to do this every 1 to 2 hours for the next 3 days (when you are awake) or until the swelling goes down. Put a thin cloth between the ice and your skin.   · You may need to use crutches until you can walk without pain. If you do use crutches, try to bear some weight on your injured ankle if you can do so without pain. This helps the ankle heal.  · Take pain medicines exactly as directed. ? If the doctor gave you a prescription medicine for pain, take it as prescribed. ? If you are not taking a prescription pain medicine, ask your doctor if you can take an over-the-counter medicine. · If you have been given ankle exercises to do at home, do them exactly as instructed. These can promote healing and help prevent lasting weakness. When should you call for help? Call your doctor now or seek immediate medical care if:    · Your pain is getting worse.     · Your swelling is getting worse.     · Your splint feels too tight or you are unable to loosen it.    Watch closely for changes in your health, and be sure to contact your doctor if:    · You are not getting better after 1 week. Where can you learn more? Go to http://keshawn-jacky.info/. Enter S742 in the search box to learn more about \"Ankle Sprain: Care Instructions. \"  Current as of: September 20, 2018  Content Version: 12.1  © 7917-5727 [employer]. Care instructions adapted under license by [employer] (which disclaims liability or warranty for this information). If you have questions about a medical condition or this instruction, always ask your healthcare professional. [address] any warranty or liability for your use of this information.

## 2019-07-26 NOTE — ED TRIAGE NOTES
Pt reports injuring her left ankle 3 weeks ago. Pt states she was wearing high heels when she twisted it. Pt states she has been putting icy hot on it and taking Motrin with minimal relief.

## 2019-07-26 NOTE — ED NOTES
I have reviewed discharge instructions with the patient. The patient verbalized understanding. Time allotted for questions. VSS. Pt given crutches.

## 2019-11-05 ENCOUNTER — OFFICE VISIT (OUTPATIENT)
Dept: FAMILY MEDICINE CLINIC | Age: 24
End: 2019-11-05

## 2019-11-05 VITALS
SYSTOLIC BLOOD PRESSURE: 111 MMHG | HEART RATE: 70 BPM | BODY MASS INDEX: 36.67 KG/M2 | TEMPERATURE: 98.4 F | WEIGHT: 214.8 LBS | DIASTOLIC BLOOD PRESSURE: 78 MMHG | RESPIRATION RATE: 16 BRPM | HEIGHT: 64 IN | OXYGEN SATURATION: 100 %

## 2019-11-05 DIAGNOSIS — Z76.89 ENCOUNTER TO ESTABLISH CARE: ICD-10-CM

## 2019-11-05 DIAGNOSIS — J40 BRONCHITIS: Primary | ICD-10-CM

## 2019-11-05 DIAGNOSIS — R42 DIZZINESS: ICD-10-CM

## 2019-11-05 RX ORDER — ALBUTEROL SULFATE 90 UG/1
AEROSOL, METERED RESPIRATORY (INHALATION)
Refills: 0 | COMMUNITY
Start: 2019-10-30

## 2019-11-05 RX ORDER — MECLIZINE HYDROCHLORIDE 25 MG/1
25 TABLET ORAL
Qty: 5 TAB | Refills: 0 | Status: SHIPPED | OUTPATIENT
Start: 2019-11-05 | End: 2019-11-10

## 2019-11-05 RX ORDER — FLUTICASONE PROPIONATE 50 MCG
2 SPRAY, SUSPENSION (ML) NASAL DAILY
Qty: 1 BOTTLE | Refills: 0 | Status: SHIPPED | OUTPATIENT
Start: 2019-11-05 | End: 2020-04-03 | Stop reason: SDUPTHER

## 2019-11-05 RX ORDER — PREDNISONE 10 MG/1
TABLET ORAL
Refills: 0 | COMMUNITY
Start: 2019-10-30 | End: 2020-10-16 | Stop reason: ALTCHOICE

## 2019-11-05 NOTE — PROGRESS NOTES
Pat Diaz is an 30 y.o. female who presents to [facility] care   Patient was previously receiving care at: Dr. Ab Booker ([address] [place] [address])   Medical history significant for: None    Currently: Patient reports dizziness and a headache since this AM. She feels like she is spinning and fatigued. She reports that the dizziness is worse when standing. Of note, the patient was seen at [facility] ED on 10/30/19. She states that she had had cold like symptoms for 4 weeks and on 10/30 she developed mid sternal chest pain and SOB along with the ongoing cough, headache, sore throat, dizziness and ear pain. There, she was diagnosed with bronchitis and given an antibiotic. She continues to have the cough but denies any chest pain or SOB. [facility]    On OCP for 1.5 years     Review of Systems   General/Constitutional:   Dizziness. No headache, fever, fatigue, weight loss or weight gain       Eyes:   No redness, pruritis, pain, visual changes, swelling, or discharge      Ears:    No pain, loss or changes in hearing     Neck:   No swelling, masses, stiffness, pain, or limited movement     Cardiac:    No chest pain      Respiratory:   Cough. No shortness of breath     GI:   No nausea/vomiting, diarrhea, abdominal pain, bloody or dark stools       :   No dysuria or  hematuria    Neurological:   No loss of consciousness, dizziness, seizures, dysarthria, cognitive changes, memory changes,  problems with balance, or unilateral weakness     Skin: No rash     Current Medications  Current medications include:   Current Outpatient Medications   Medication Sig    albuterol (PROVENTIL HFA, VENTOLIN HFA, PROAIR HFA) 90 mcg/actuation inhaler TAKE 1 TO 2 PUFFS BY MOUTH EVERY 4 TO 6 HOURS AS NEEDED FOR DYSPNEA/WHEEZING    predniSONE (DELTASONE) 10 mg tablet TAKE 6 TABLETS BY MOUTH ONCE DAILY UNTIL FINISHED    acetaminophen (TYLENOL EXTRA STRENGTH) 500 mg tablet Take 500 mg by mouth as needed for Pain.     multivitamin (ONE A DAY) tablet Take 1 Tab by mouth daily.  levonorgestrel (KYLEENA) 17.5 mcg/24 hr (5 years) IUD IUD 1 Each by IntraUTERine route once.  azithromycin (ZITHROMAX) 500 mg tab Take two tabs by mouth for a one time dose of 1 gram. Please brings pills to the office.  doxycycline (ADOXA) 100 mg tablet Take one tab by mouth bid for 7 days    norethindrone-ethinyl estradiol-iron (LOESTRIN FE 1.5/30, 28-DAY,) 1.5 mg-30 mcg (21)/75 mg (7) tab Take 1 Tab by mouth daily. No current facility-administered medications for this visit. Allergies  Allergies   Allergen Reactions    Chicken Flavor Itching    Other Medication Hives     Ham       Past Medical History  Past Medical History:   Diagnosis Date    Chlamydia     Ill-defined condition     PID    Migraines      Past Surgical History   History reviewed. No pertinent surgical history. Family History  Family History   Problem Relation Age of Onset    Ovarian Cancer Mother     Hypertension Maternal Grandmother     Diabetes Maternal Grandmother      Social History  Social History     Socioeconomic History    Marital status: SINGLE     Spouse name: Not on file    Number of children: Not on file    Years of education: Not on file    Highest education level: Not on file   Tobacco Use    Smoking status: Never Smoker    Smokeless tobacco: Never Used   Substance and Sexual Activity    Alcohol use: Yes     Alcohol/week: 3.0 - 4.0 standard drinks     Types: 3 - 4 Shots of liquor per week    Drug use: No    Sexual activity: Yes     Partners: Male     Birth control/protection: IUD     Immunizations  There is no immunization history on file for this patient. Objective   Visit Vitals  /80   Pulse 76   Temp 98.4 °F (36.9 °C) (Oral)   Resp 16   Ht 5' 4\" (1.626 m)   Wt 214 lb 12.8 oz (97.4 kg)   SpO2 100%   BMI 36.87 kg/m²     Physical Examination  GEN: No apparent distress.  Alert and oriented and responds to all questions appropriately. EYES:  Conjunctiva clear; pupils round and reactive to light; extraocular movements are intact. Dizziness illicited on extraocular movements. EAR: External ears are normal.  Tympanic membranes are clear and without effusion. NOSE: Turbinates are within normal limits. No drainage  OROPHYARYNX: No oral lesions or exudates. NECK:  Supple; no masses; thyroid normal           LUNGS: Respirations unlabored; clear to auscultation bilaterally  CARDIOVASCULAR: Regular, rate, and rhythm without murmurs, gallops or rubs   ABDOMEN: Soft; nontender; nondistended; normoactive bowel sounds; no masses or organomegaly  NEUROLOGIC:  No focal neurologic deficits. Strength and sensation grossly intact. Coordination and gait grossly intact. Norma hallpike negative. EXT: Well perfused. No edema. SKIN: No obvious rashes. Assessment:   Pat Diaz is a 30 y.o. here to establish to care     Plan:   1. Encounter to establish care  - Counseled re: diet, exercise, healthy lifestyle  - Medical release form signed to obtain old records. Will update chart upon receipt     2. Bronchitis  - Patient continues to have a cough and congestion. This will likely improve over next few weeks. - Ordered Flonase for symptomatic relief. 3. Dizziness  - Dizziness likely secondary to recent bronchitis and can be related to patient's congestion and fatigue  - Orthostatics negative. [place] hallpike negative   - Trial-ing Meclizine nightly for up to 5 days to help relieve symptoms  - Encouraging PO intake and rest   - Patient to return if symptoms worsen or do not improve     I discussed the aforementioned diagnoses with the patient as well as the plan of care. All questions were answered and an AVS was provided.    I discussed this patient with Dr. Jimmy Johnston (Attending Physician)      Signed By:  Wil Saini MD

## 2019-11-05 NOTE — PROGRESS NOTES
Chief Complaint   Patient presents with   [facility] Follow-up     Blood pressure 119/80, pulse 76, temperature 98.4 °F (36.9 °C), temperature source Oral, resp. rate 16, height 5' 4\" (1.626 m), weight 214 lb 12.8 oz (97.4 kg), SpO2 100 %. 1. Have you been to the ER, urgent care clinic since your last visit? Hospitalized since your last visit? Yes When: 10/29/19 Where: [facility]     2. Have you seen or consulted any other health care providers outside of the [address] [place] [address] since your last visit? Include any pap smears or colon screening.  No    Vitals:    11/05/19 1104 11/05/19 1126 11/05/19 1128 11/05/19 1130   BP: 119/80 105/70 102/71 111/78   BP 1 Location:  Left arm Left arm Left arm   BP Patient Position:  Supine Sitting Standing   Pulse: 76 83 70    Resp: 16      Temp: 98.4 °F (36.9 °C)      TempSrc: Oral      SpO2: 100%      Weight: 214 lb 12.8 oz (97.4 kg)      Height: 5' 4\" (1.626 m)

## 2020-04-03 ENCOUNTER — TELEPHONE (OUTPATIENT)
Dept: FAMILY MEDICINE CLINIC | Age: 25
End: 2020-04-03

## 2020-04-03 DIAGNOSIS — J40 BRONCHITIS: ICD-10-CM

## 2020-04-03 RX ORDER — FLUTICASONE PROPIONATE 50 MCG
2 SPRAY, SUSPENSION (ML) NASAL 2 TIMES DAILY
Qty: 1 BOTTLE | Refills: 0 | Status: SHIPPED | OUTPATIENT
Start: 2020-04-03 | End: 2021-08-03

## 2020-04-03 NOTE — TELEPHONE ENCOUNTER
PT called saying her allergies are acting up really bad this year, developed a bump inside her nose that is hurting pretty bad. Clear of any COVID symptoms.

## 2020-04-03 NOTE — TELEPHONE ENCOUNTER
----- Message from Ledy Brewer sent at 4/3/2020  3:11 PM EDT -----  Regarding: Dr. Celena Edmond: [phone]  Caller's first and last name: N/A  Reason for call: Pt is having a difficult time breathing, stated has a bump in right nostril and breathes through her mouth during the night. Asking a prescription for her allergies, claritan not working like it used to.   Callback required yes/no and why: Yes, to inform the patient  Best contact number(s): [phone]  Details to clarify the request: N/A

## 2020-04-04 NOTE — TELEPHONE ENCOUNTER
Spoke to [employer] this evening. She states that she has seasonal allergies and this year the pollen has made them worse. She has been trying Zyrtec twice daily with some relief. Over the past two weeks she has noted a bump inside her right nostril that fluctuates in size daily. It has made it a little hard to breathe at night. When she goes outside her rhinorrhea and cough worsen. She is in no acute distress. She denies any fevers or sick contacts. We will trial Flonase BID for a couple days. She will continue to take Zyrtec daily. Patient will call back on Monday if symptoms worsen or do not improve. Will be triaged per COVID protocol and then can be seen in clinic.

## 2020-04-18 ENCOUNTER — TELEPHONE (OUTPATIENT)
Dept: FAMILY MEDICINE CLINIC | Age: 25
End: 2020-04-18

## 2020-04-18 NOTE — TELEPHONE ENCOUNTER
Returned patient's phone call received through answering service. Has a a bump on the outside of her lip that appeared a few days ago. Has become mildly swollen and painful. No fever, chills, purulent discharge. Advised to monitor and go to urgent care/ED if acutely worsening. Otherwise, make appointment with PCP on Monday 4/20 via virtual appt if no improvement.      Man Simpson,   04/18/20 9:35 AM

## 2020-10-16 ENCOUNTER — OFFICE VISIT (OUTPATIENT)
Dept: FAMILY MEDICINE CLINIC | Age: 25
End: 2020-10-16

## 2020-10-16 VITALS
OXYGEN SATURATION: 100 % | HEIGHT: 64 IN | HEART RATE: 82 BPM | RESPIRATION RATE: 16 BRPM | DIASTOLIC BLOOD PRESSURE: 68 MMHG | WEIGHT: 220 LBS | SYSTOLIC BLOOD PRESSURE: 113 MMHG | TEMPERATURE: 97.7 F | BODY MASS INDEX: 37.56 KG/M2

## 2020-10-16 DIAGNOSIS — R42 DIZZINESS: Primary | ICD-10-CM

## 2020-10-16 DIAGNOSIS — G44.309 POST-CONCUSSION HEADACHE: ICD-10-CM

## 2020-10-16 PROCEDURE — 99214 OFFICE O/P EST MOD 30 MIN: CPT | Performed by: STUDENT IN AN ORGANIZED HEALTH CARE EDUCATION/TRAINING PROGRAM

## 2020-10-16 RX ORDER — BUTALBITAL, ACETAMINOPHEN AND CAFFEINE 50; 325; 40 MG/1; MG/1; MG/1
TABLET ORAL
COMMUNITY
Start: 2020-10-13 | End: 2021-08-03

## 2020-10-16 RX ORDER — SUMATRIPTAN 50 MG/1
TABLET, FILM COATED ORAL
Qty: 30 TAB | Refills: 0 | Status: SHIPPED | OUTPATIENT
Start: 2020-10-16 | End: 2020-10-17

## 2020-10-16 RX ORDER — MECLIZINE HYDROCHLORIDE 25 MG/1
25 TABLET ORAL
Qty: 90 TAB | Refills: 0 | Status: SHIPPED | OUTPATIENT
Start: 2020-10-16 | End: 2021-08-03

## 2020-10-16 RX ORDER — NAPROXEN 250 MG/1
250 TABLET ORAL
Qty: 60 TAB | Refills: 0 | Status: SHIPPED | OUTPATIENT
Start: 2020-10-16 | End: 2020-11-12
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
## 2020-10-16 NOTE — PATIENT INSTRUCTIONS
Concussion: Care Instructions Your Care Instructions A concussion is a kind of injury to the brain. It happens when the head receives a hard blow. The impact can jar or shake the brain against the skull. This interrupts the brain's normal activities. Although you may have cuts or bruises on your head or face, you may have no other visible signs of a brain injury. In most cases, damage to the brain from a concussion can't be seen in tests such as a CT or MRI scan. For a few weeks, you may have low energy, dizziness, trouble sleeping, a headache, ringing in your ears, or nausea. You may also feel anxious, grumpy, or depressed. You may have problems with memory and concentration. These symptoms are common after a concussion. They should slowly improve over time. Sometimes this takes weeks or even months. Someone who lives with you should know how to care for you. Please share this and all information with a caregiver who will be available to help if needed. Follow-up care is a key part of your treatment and safety. Be sure to make and go to all appointments, and call your doctor if you are having problems. It's also a good idea to know your test results and keep a list of the medicines you take. How can you care for yourself at home? Pain control · Put ice or a cold pack on the part of your head that hurts for 10 to 20 minutes at a time. Put a thin cloth between the ice and your skin. · Be safe with medicines. Read and follow all instructions on the label. ? If the doctor gave you a prescription medicine for pain, take it as prescribed. ? If you are not taking a prescription pain medicine, ask your doctor if you can take an over-the-counter medicine. Recovery · Follow your doctor's instructions. He or she will tell you if you need someone to watch you closely for the next 24 hours or longer. · Rest is the best way to recover from a concussion. You need to rest your body and your brain: ? Get plenty of sleep at night. And take rest breaks during the day. ? Avoid activities that take a lot of physical or mental work. This includes housework, exercise, schoolwork, video games, text messaging, and using the computer. ? You may need to change your school or work schedule while you recover. ? Return to your normal activities slowly. Do not try to do too much at once. · Do not drink alcohol or use illegal drugs. Alcohol and illegal drugs can slow your recovery. And they can increase your risk of a second brain injury. · Avoid activities that could lead to another concussion. Follow your doctor's instructions for a gradual return to activity and sports. · Ask your doctor when it's okay for you to drive a car, ride a bike, or operate machinery. How should you return to activity? Your return to activity can begin after 1 to 2 days of physical and mental rest. After resting, you can gradually increase your activity as long as it does not cause new symptoms or worsen your symptoms. Doctors and concussion specialists suggest steps to follow for returning to sports after a concussion. Use these steps as a guide. You should slowly progress through the following levels of activity: 1. Limited activity. You can take part in daily activities as long as the activity doesn't increase your symptoms or cause new symptoms. 2. Light aerobic activity. This can include walking, swimming, or other exercise at less than 70% of maximum heart rate. No resistance training is included in this step. 3. Sport-specific exercise. This includes running drills or skating drills (depending on the sport), but no head impact. 4. Noncontact training drills. This includes more complex training drills such as passing. The athlete may also begin light resistance training. 5. Full-contact practice. The athlete can participate in normal training. 6. Return to normal game play.  This is the final step and allows the athlete to join in normal game play. Watch and keep track of your progress. It should take at least 6 days for you to go from light activity to normal game play. Make sure that you can stay at each new level of activity for at least 24 hours without symptoms, or as long as your doctor says, before doing more. If one or more symptoms come back, return to a lower level of activity for at least 24 hours. Don't move on until all symptoms are gone. When should you call for help? Call 911 anytime you think you may need emergency care. For example, call if: 
  · You have a seizure.  
  · You passed out (lost consciousness).  
  · You are confused or can't stay awake. Call your doctor now or seek immediate medical care if: 
  · You have new or worse vomiting.  
  · You feel less alert.  
  · You have new weakness or numbness in any part of your body. Watch closely for changes in your health, and be sure to contact your doctor if: 
  · You do not get better as expected.  
  · You have new symptoms, such as headaches, trouble concentrating, or changes in mood. Where can you learn more? Go to http://www.gray.com/ Enter F379 in the search box to learn more about \"Concussion: Care Instructions. \" Current as of: November 20, 2019               Content Version: 12.6 © 3428-2278 [employer]. Care instructions adapted under license by [employer] (which disclaims liability or warranty for this information). If you have questions about a medical condition or this instruction, always ask your healthcare professional. [place] [address] any warranty or liability for your use of this information.
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
## 2020-10-17 RX ORDER — SUMATRIPTAN 50 MG/1
TABLET, FILM COATED ORAL
Qty: 30 TAB | Refills: 0 | Status: SHIPPED | OUTPATIENT
Start: 2020-10-17 | End: 2021-01-28 | Stop reason: SDUPTHER

## 2020-10-29 ENCOUNTER — OFFICE VISIT (OUTPATIENT)
Dept: FAMILY MEDICINE CLINIC | Age: 25
End: 2020-10-29

## 2020-10-29 VITALS
WEIGHT: 217 LBS | BODY MASS INDEX: 37.05 KG/M2 | HEIGHT: 64 IN | SYSTOLIC BLOOD PRESSURE: 100 MMHG | DIASTOLIC BLOOD PRESSURE: 63 MMHG | HEART RATE: 74 BPM | TEMPERATURE: 97.7 F | OXYGEN SATURATION: 96 % | RESPIRATION RATE: 16 BRPM

## 2020-10-29 DIAGNOSIS — G44.309 POST-CONCUSSION HEADACHE: Primary | ICD-10-CM

## 2020-10-29 PROCEDURE — 99214 OFFICE O/P EST MOD 30 MIN: CPT | Performed by: STUDENT IN AN ORGANIZED HEALTH CARE EDUCATION/TRAINING PROGRAM

## 2020-10-29 RX ORDER — CYCLOBENZAPRINE HCL 5 MG
5 TABLET ORAL
Qty: 42 TAB | Refills: 0 | Status: SHIPPED | OUTPATIENT
Start: 2020-10-29 | End: 2021-08-03
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
## 2020-11-05 ENCOUNTER — HOSPITAL ENCOUNTER (OUTPATIENT)
Dept: PHYSICAL THERAPY | Age: 25
Discharge: HOME | End: 2020-11-05

## 2020-11-05 PROCEDURE — 97161 PT EVAL LOW COMPLEX 20 MIN: CPT | Performed by: PHYSICAL THERAPIST

## 2020-11-05 PROCEDURE — 97110 THERAPEUTIC EXERCISES: CPT | Performed by: PHYSICAL THERAPIST
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
## 2020-11-05 NOTE — PROGRESS NOTES
Physical Therapy at [facility],   a part of [address], [address]  Phone: [phone]  Fax: [phone]    Plan of Care/Statement of Necessity for Physical Therapy Services  2-15    Patient name: Martina Huff  : 1995  Provider#: [id number]  Referral source: Edvin Walker,*      Medical/Treatment Diagnosis: Dizziness and giddiness [R42]     Prior Hospitalization: see medical history     Comorbidities: obesity   Prior Level of Function: able to work, turn head without difficulty, dizziness  Medications: Verified on Patient Summary List    Start of Care: 2020      Onset Date: 10/10/2020       The Plan of Care and following information is based on the information from the initial evaluation. Assessment/ key information: Patient reports with symptoms consistent with post-concussion syndrome. Her most consistent symptoms are a left sided headache, and dizziness associated with movements of her head. Her headache appears at least in part cervico-genic. She also exhibits reduced balance. Difficult to get thorough assessment today due to increasing symptoms with testing. Will need to try to get an assessment of VOR and potentially a [place]-Hallpike test next session.      Evaluation Complexity History MEDIUM  Complexity : 1-2 comorbidities / personal factors will impact the outcome/ POC ; Examination HIGH Complexity : 4+ Standardized tests and measures addressing body structure, function, activity limitation and / or participation in recreation  ;Presentation LOW Complexity : Stable, uncomplicated    Overall Complexity Rating: LOW     Problem List: pain affecting function, decrease ROM, decrease strength, decrease ADL/ functional abilitiies, decrease activity tolerance and decrease flexibility/ joint mobility   Treatment Plan may include any combination of the following: Therapeutic exercise, Therapeutic activities, Neuromuscular re-education, Physical agent/modality, Manual therapy, Patient education and Self Care training  Patient / Family readiness to learn indicated by: asking questions and interest  Persons(s) to be included in education: patient (P)  Barriers to Learning/Limitations: None  Patient Goal (s): get and feel better  Patient Self Reported Health Status: good  Rehabilitation Potential: good    Short Term Goals: To be accomplished in 2-4 treatments:   1. Pt will be compliant with initial PT attendance and HEP use   2. Pt will be able to tolerate VOR x1 in sitting at at least 60bpm without increase in symptoms  Long Term Goals: To be accomplished in 10-12 treatments:   1. Pt will be able to perform VOR x1 in walking @120bpm without increase in symptoms   2. Pt will report at least 75% reduction in headache symptoms   3. Pt will be able to return to work full time without significant increase in symptoms   4. Pt will be able to self-manage care using updated HEP for improved independence    5. Pt will be able to look over her L shoulder without increase in symptoms    Frequency / Duration: Patient to be seen 2 times per week for 4-6 weeks. Patient/ Caregiver education and instruction: self care, activity modification and exercises    [x]  Plan of care has been reviewed with PTA    Felicia Farooq PT, DPT 11/5/2020     ________________________________________________________________________    I certify that the above Therapy Services are being furnished while the patient is under my care. I agree with the treatment plan and certify that this therapy is necessary.     [id number] Signature:____________________  Date:____________Time: _________

## 2020-11-09 ENCOUNTER — TELEPHONE (OUTPATIENT)
Dept: FAMILY MEDICINE CLINIC | Age: 25
End: 2020-11-09

## 2020-11-09 NOTE — TELEPHONE ENCOUNTER
Need to speak with you about the headache she had yesterday. .. This headache was completely different from before. ... Patient want to speak with you today. ...

## 2020-11-11 ENCOUNTER — HOSPITAL ENCOUNTER (OUTPATIENT)
Dept: PHYSICAL THERAPY | Age: 25
Discharge: HOME | End: 2020-11-11

## 2020-11-11 PROCEDURE — 97110 THERAPEUTIC EXERCISES: CPT | Performed by: PHYSICAL THERAPIST

## 2020-11-11 PROCEDURE — 97112 NEUROMUSCULAR REEDUCATION: CPT | Performed by: PHYSICAL THERAPIST

## 2020-11-11 PROCEDURE — 97014 ELECTRIC STIMULATION THERAPY: CPT | Performed by: PHYSICAL THERAPIST

## 2020-11-11 PROCEDURE — 97140 MANUAL THERAPY 1/> REGIONS: CPT | Performed by: PHYSICAL THERAPIST

## 2020-11-11 NOTE — PROGRESS NOTES
PT DAILY TREATMENT NOTE 2-15    Patient Name: Melynda Lennox  Date:2020  : 1995  [x]  Patient  Verified  Payor: SELF PAY / Plan: [facility] FLAT RATE SELF PAY / Product Type: Self Pay /    In time:933a  Out time:1055a  Total Treatment Time (min): 75  Visit #:  2    Treatment Area: Dizziness and giddiness [R42]    SUBJECTIVE  Pain Level (0-10 scale): 4/10  Any medication changes, allergies to medications, adverse drug reactions, diagnosis change, or new procedure performed?: [x] No    [] Yes (see summary sheet for update)  Subjective functional status/changes:   [] No changes reported  Pt states that the left side of her neck (suboccipital region) is very painful yesterday and today.     OBJECTIVE    Modality rationale: decrease pain to improve the patients ability to look over shoulders   Min Type Additional Details    15   [x] Estim: []Att   [x]Unatt    []TENS instruct                  []IFC  [x]Premod   []NMES                     []Other:  []w/US   []w/ice   [x]w/heat  Position: reclined  Location: Left cervical paraspinal and upper trap       []  Traction: [] Cervical       []Lumbar                       [] Prone          []Supine                       []Intermittent   []Continuous Lbs:  [] before manual  [] after manual  []w/heat    []  Ultrasound: []Continuous   [] Pulsed                       at: []1MHz   []3MHz Location:  W/cm2:    [] Paraffin         Location:   []w/heat    []  Ice     []  Heat  []  Ice massage Position:  Location:    []  Laser  []  Other: Position:  Location:      []  Vasopneumatic Device Pressure:       [] lo [] med [] hi   Temperature:      [x] Skin assessment post-treatment:  [x]intact []redness- no adverse reaction    []redness  adverse reaction:         30 min Therapeutic Exercise:  [x] See flow sheet : review of current status and HEP; passive cervical rotation R/L   Rationale: increase ROM, increase strength, improve coordination, improve balance and increase proprioception to improve the patients ability to resume normal activity     15 min Neuromuscular Re-education:  [x]  See flow sheet : review of HEP   Rationale: increase ROM, increase strength, improve coordination, improve balance and increase proprioception  to improve the patients ability to resume normal activity without dizziness    15 min Manual Therapy:  STM left suboccipital region, cervical paraspinal and left upper trap muscles   Rationale: decrease pain, increase ROM and increase tissue extensibility  to improve the patients ability to look over shoulders      With   [] TE   [] TA   [] neuro   [] other: Patient Education: [x] Review HEP    [] Progressed/Changed HEP based on:   [] positioning   [] body mechanics   [] transfers   [] heat/ice application    [] other:      Other Objective/Functional Measures: Cervical AROM Rotation R 42  L 48     Pain Level (0-10 scale) post treatment: 3/10    ASSESSMENT/Changes in Function:   Did not advance visual/vestibular training today with focus more on musculoskeletal contribution. Patient will continue to benefit from skilled PT services to modify and progress therapeutic interventions, address functional mobility deficits, address ROM deficits, address strength deficits, analyze and address soft tissue restrictions, analyze and cue movement patterns, analyze and modify body mechanics/ergonomics, assess and modify postural abnormalities and address imbalance/dizziness to attain remaining goals. []  See Plan of Care  []  See progress note/recertification  []  See Discharge Summary         Progress towards goals / Updated goals:  Short Term Goals: To be accomplished in 2-4 treatments:               1. Pt will be compliant with initial PT attendance and HEP use Progressing               2. Pt will be able to tolerate VOR x1 in sitting at at least 60bpm without increase in symptoms  Long Term Goals:  To be accomplished in 10-12 treatments:               1. Pt will be able to perform VOR x1 in walking @120bpm without increase in symptoms               2. Pt will report at least 75% reduction in headache symptoms               3. Pt will be able to return to work full time without significant increase in symptoms               4. Pt will be able to self-manage care using updated HEP for improved independence               5. Pt will be able to look over her L shoulder without increase in symptoms       PLAN  [x]  Upgrade activities as tolerated     [x]  Continue plan of care  []  Update interventions per flow sheet       []  Discharge due to:_  []  Other:_      Matthieu Ceron, PT, DPT 11/11/2020

## 2020-11-12 DIAGNOSIS — G44.309 POST-CONCUSSION HEADACHE: ICD-10-CM

## 2020-11-12 RX ORDER — NAPROXEN 250 MG/1
TABLET ORAL
Qty: 60 TAB | Refills: 0 | Status: SHIPPED | OUTPATIENT
Start: 2020-11-12 | End: 2021-01-28 | Stop reason: SDUPTHER

## 2020-11-18 ENCOUNTER — APPOINTMENT (OUTPATIENT)
Dept: PHYSICAL THERAPY | Age: 25
End: 2020-11-18

## 2020-11-20 ENCOUNTER — OFFICE VISIT (OUTPATIENT)
Dept: FAMILY MEDICINE CLINIC | Age: 25
End: 2020-11-20

## 2020-11-20 ENCOUNTER — HOSPITAL ENCOUNTER (OUTPATIENT)
Dept: PHYSICAL THERAPY | Age: 25
End: 2020-11-20

## 2020-11-20 VITALS
TEMPERATURE: 97.1 F | OXYGEN SATURATION: 98 % | SYSTOLIC BLOOD PRESSURE: 97 MMHG | WEIGHT: 216.13 LBS | HEART RATE: 64 BPM | DIASTOLIC BLOOD PRESSURE: 63 MMHG | RESPIRATION RATE: 16 BRPM | BODY MASS INDEX: 37.1 KG/M2

## 2020-11-20 DIAGNOSIS — M25.531 RIGHT WRIST PAIN: Primary | ICD-10-CM

## 2020-11-20 DIAGNOSIS — M65.9 TENOSYNOVITIS OF RIGHT WRIST: ICD-10-CM

## 2020-11-20 PROCEDURE — 99214 OFFICE O/P EST MOD 30 MIN: CPT | Performed by: STUDENT IN AN ORGANIZED HEALTH CARE EDUCATION/TRAINING PROGRAM
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
## 2020-11-20 NOTE — PATIENT INSTRUCTIONS
Tenosynovitis of the Wrist: Care Instructions Your Care Instructions Tenosynovitis (say \"ten-oh-sin-uh-VY-tus\") means the lining of a tendon is inflamed. This problem usually affects tendons in your thumb and wrist. A tendon is a cord that joins muscle to bone. Tenosynovitis can be caused by an injury. Or it may be caused by repeating a movement over and over, such as when you knit, lift things, or play video games. In rare cases, an infected wound causes it. In most cases, you can recover fully. But if the problem is caused by doing something over and over and you don't stop or change doing that, it may come back. Follow-up care is a key part of your treatment and safety. Be sure to make and go to all appointments, and call your doctor if you are having problems. It's also a good idea to know your test results and keep a list of the medicines you take. How can you care for yourself at home? · Prop up the sore wrist on a pillow when you ice it or anytime you sit or lie down during the next 3 days. Try to keep it above the level of your heart. This will help reduce swelling. · Put ice or cold packs on your wrist for 10 to 20 minutes at a time. Try to do this every 1 to 2 hours for the next 3 days (when you are awake) or until the swelling goes down. Put a thin cloth between the ice pack and your skin. · If your swelling is gone after 2 or 3 days, put a heating pad set on low or a warm cloth on your wrist for 15 to 20 minutes. This can reduce pain. · If your doctor gave you an elastic bandage, keep it on for the next 24 to 36 hours or for as long as your doctor told you. The bandage should be snug. But it should not be tight enough to cause numbness, tingling, or swelling. · If your doctor gave you a splint or brace, wear it as directed. It will protect your wrist until it is better. · Take pain medicines exactly as directed.  
? If the doctor gave you a prescription medicine for pain, take it as prescribed. ? If you are not taking a prescription pain medicine, ask your doctor if you can take an over-the-counter medicine. · If your doctor prescribes antibiotics, take them as directed. Do not stop taking them just because you feel better. You need to take the full course of antibiotics. · Try not to use your injured wrist and hand. · After you are better, do exercises to make the muscles around your tendon stronger. This can prevent the problem from coming back. Follow instructions from your doctor. When should you call for help? Call your doctor now or seek immediate medical care if: 
  · Your hand or fingers are cool or pale or change colors.  
  · You have tingling, weakness, or numbness in your hand and fingers.  
  · Your pain gets worse.  
  · The tendon may be infected. Signs of infection include: ? Increased pain and tenderness around the wrist or thumb. ? Swelling or redness of the wrist or thumb. ? A fever. Watch closely for changes in your health, and be sure to contact your doctor if: 
  · You do not get better as expected. Where can you learn more? Go to http://www.gray.com/ Enter U340 in the search box to learn more about \"Tenosynovitis of the Wrist: Care Instructions. \" Current as of: March 2, 2020               Content Version: 12.6 © 7794-1130 [employer]wise, Incorporated. Care instructions adapted under license by [employer] (which disclaims liability or warranty for this information). If you have questions about a medical condition or this instruction, always ask your healthcare professional. [place] [address] any warranty or liability for your use of this information. Wrist Tendinitis: Exercises Introduction Here are some examples of exercises for you to try. The exercises may be suggested for a condition or for rehabilitation. Start each exercise slowly. Ease off the exercises if you start to have pain. You will be told when to start these exercises and which ones will work best for you. How to do the exercises Wrist flexion and extension 1. Place your forearm on a table, with your hand and affected wrist extended beyond the table, palm down. 2. Bend your wrist to move your hand upward and allow your hand to close into a fist, then lower your hand and allow your fingers to relax. Hold each position for about 6 seconds. 3. Repeat 8 to 12 times. Hand flips 1. While seated, place your forearm and affected wrist on your thigh, palm down. 2. Flip your hand over so the back of your hand rests on your thigh and your palm is up. Alternate between palm up and palm down while keeping your forearm on your thigh. 3. Repeat 8 to 12 times. Wrist radial and ulnar deviation 1. Hold your affected hand out in front of you, palm down. 2. Slowly bend your wrist as far as you can from side to side. Hold each position for about 6 seconds. 3. Repeat 8 to 12 times. Wrist extensor stretch 1. Extend the arm with the affected wrist in front of you and point your fingers toward the floor. 2. With your other hand, gently bend your wrist farther until you feel a mild to moderate stretch in your forearm. 3. Hold the stretch for at least 15 to 30 seconds. 4. Repeat 2 to 4 times. 5. When you can do this stretch with ease and no pain, repeat steps 1 through 4. But this time extend your affected arm in front of you and make a fist with your palm facing down. Then bend your wrist, pointing your fist toward the floor. Wrist flexor stretch 1. Extend the arm with the affected wrist in front of you with your palm facing away from your body. 2. Bend back your wrist, pointing your hand up toward the ceiling. 3. With your other hand, gently bend your wrist farther until you feel a mild to moderate stretch in your forearm. 4. Hold the stretch for at least 15 to 30 seconds. 5. Repeat 2 to 4 times. 6. Repeat steps 1 through 5, but this time extend your affected arm in front of you with your palm facing up. Then bend back your wrist, pointing your hand toward the floor. Follow-up care is a key part of your treatment and safety. Be sure to make and go to all appointments, and call your doctor if you are having problems. It's also a good idea to know your test results and keep a list of the medicines you take. Where can you learn more? Go to http://www.gray.com/ Enter D122 in the search box to learn more about \"Wrist Tendinitis: Exercises. \" Current as of: March 2, 2020               Content Version: 12.6 © 0279-2951 [employer]. Care instructions adapted under license by [employer] (which disclaims liability or warranty for this information). If you have questions about a medical condition or this instruction, always ask your healthcare professional. [address] any warranty or liability for your use of this information. Wrist: Exercises Introduction Here are some examples of exercises for you to try. The exercises may be suggested for a condition or for rehabilitation. Start each exercise slowly. Ease off the exercises if you start to have pain. You will be told when to start these exercises and which ones will work best for you. How to do the exercises Prayer stretch 4. Start with your palms together in front of your chest just below your chin. 5. Slowly lower your hands toward your waistline, keeping your hands close to your stomach and your palms together until you feel a mild to moderate stretch under your forearms. 6. Hold for at least 15 to 30 seconds. Repeat 2 to 4 times. Wrist flexor stretch 4. Extend your arm in front of you with your palm up. 5. Bend your wrist, pointing your hand toward the floor.  
6. With your other hand, gently bend your wrist farther until you feel a mild to moderate stretch in your forearm. 7. Hold for at least 15 to 30 seconds. Repeat 2 to 4 times. Wrist extensor stretch 4. Repeat steps 1 through 4 of the stretch above, but begin with your extended hand palm down. Follow-up care is a key part of your treatment and safety. Be sure to make and go to all appointments, and call your doctor if you are having problems. It's also a good idea to know your test results and keep a list of the medicines you take. Where can you learn more? Go to http://www.gray.com/ Enter 37246 12 60 01 in the search box to learn more about \"Wrist: Exercises. \" Current as of: March 2, 2020               Content Version: 12.6 © 1596-9443 [employer]. Care instructions adapted under license by [employer] (which disclaims liability or warranty for this information). If you have questions about a medical condition or this instruction, always ask your healthcare professional. [place] [address] any warranty or liability for your use of this information.

## 2020-11-24 NOTE — PROGRESS NOTES
I saw and evaluated the patient, performing the key elements of the service. I discussed the findings, assessment and plan with the resident and agree with the resident's findings and plan as documented in the resident's note. 29 yo female who presents for wrist pain. XR with no fx. The patient was examined personally, exam c/w [place] tenosynovitis with carpal tunnel syndrome. Advised to wear the immobilizer.

## 2020-12-14 NOTE — ANCILLARY DISCHARGE INSTRUCTIONS
Physical Therapy at [facility],  
a part of [address] 
[address] [address], [address] Phone: [phone]  Fax: [phone] Discharge Summary  2-15 Patient name: Ed Tee  : 1995  Provider#: [id number] Referral source: Natalie Truong Medical/Treatment Diagnosis: Dizziness and giddiness [R42] Prior Hospitalization: see medical history Comorbidities: obesity Prior Level of Function: able to work, turn head without difficulty, dizziness Medications: Verified on Patient Summary List 
  
Start of Care: 2020                                                                              Onset Date: 10/10/2020 Visits from Start of Care: 2     Missed Visits: 4 Reporting Period : 2020 to 2020 ASSESSMENT/SUMMARY OF CARE: Ms. Bjorn Seo was seen for 2 sessions regarding her post-concussion symptoms. She was inconsistent with attendance and failed to return after her 2020 session, and will be discharged at this time. Short Term Goals: To be accomplished in 2-4 treatments: 
             1. Pt will be compliant with initial PT attendance and HEP use Progressing 
             2. Pt will be able to tolerate VOR x1 in sitting at at least 60bpm without increase in symptoms Long Term Goals: To be accomplished in 10-12 treatments: 
             1. Pt will be able to perform VOR x1 in walking @120bpm without increase in symptoms              2. Pt will report at least 75% reduction in headache symptoms              3. Pt will be able to return to work full time without significant increase in symptoms              4. Pt will be able to self-manage care using updated HEP for improved independence  
            5. Pt will be able to look over her L shoulder without increase in symptoms RECOMMENDATIONS: 
[x]Discontinue therapy: []Patient has reached or is progressing toward set goals [x]Patient is non-compliant or has abdicated 
    []Due to lack of appreciable progress towards set goals Dot Guzmán, PT, DPT 12/14/2020
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
## 2021-01-28 ENCOUNTER — VIRTUAL VISIT (OUTPATIENT)
Dept: FAMILY MEDICINE CLINIC | Age: 26
End: 2021-01-28

## 2021-01-28 DIAGNOSIS — R42 DIZZINESS: ICD-10-CM

## 2021-01-28 DIAGNOSIS — M65.9 TENOSYNOVITIS OF RIGHT WRIST: ICD-10-CM

## 2021-01-28 DIAGNOSIS — G44.309 POST-CONCUSSION HEADACHE: ICD-10-CM
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
## 2021-01-28 PROCEDURE — 99214 OFFICE O/P EST MOD 30 MIN: CPT | Performed by: STUDENT IN AN ORGANIZED HEALTH CARE EDUCATION/TRAINING PROGRAM

## 2021-01-28 RX ORDER — NAPROXEN 250 MG/1
TABLET ORAL
Qty: 60 TAB | Refills: 0 | Status: SHIPPED | OUTPATIENT
Start: 2021-01-28 | End: 2021-08-03

## 2021-01-28 RX ORDER — SUMATRIPTAN 50 MG/1
TABLET, FILM COATED ORAL
Qty: 30 TAB | Refills: 0 | Status: SHIPPED | OUTPATIENT
Start: 2021-01-28 | End: 2021-08-03

## 2021-01-30 NOTE — PROGRESS NOTES
[address] Medicine Residency Attending Addendum:  Dr. Ralf Tamez MD,  the patient and I were not physically present during this encounter. The resident and I are concurrently monitoring the patient care through appropriate telecommunication technology. I discussed the findings, assessment and plan with the resident and agree with the resident's findings and plan as documented in the resident's note.       Christy Valladares MD

## 2021-02-07 ENCOUNTER — TELEPHONE (OUTPATIENT)
Dept: FAMILY MEDICINE CLINIC | Age: 26
End: 2021-02-07
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
## 2021-02-07 NOTE — TELEPHONE ENCOUNTER
Received call from [facility] re: headache and dizziness; recently seen by Dr. Markus Marquez for post-concussion syndrome. Attempted to return call but had to leave voicemail.

## 2021-02-09 ENCOUNTER — TELEPHONE (OUTPATIENT)
Dept: FAMILY MEDICINE CLINIC | Age: 26
End: 2021-02-09

## 2021-02-09 NOTE — TELEPHONE ENCOUNTER
----- Message from Johanna Hammond sent at 2/8/2021  9:57 AM EST -----  Regarding: Dr. Cook Pouch: [phone]  General Message/Vendor Calls    Caller's first and last name: N/A      Reason for call: To schedule ER follow up visit       Callback required yes/no and why: Yes for appt       Best contact number(s): [phone]      Details to clarify the request: Pt was seen at [facility] on 02/07/21 for blurry vision, headache and nausea. CT scan was done and it came back fine, ER physician gave her some Rx that did not work and told her to follow up with her PCP. Pt would prefer to see Dr. Irvin Blanco. Please call pt to schedule appt.        Tiara L Toussaint

## 2021-02-10 ENCOUNTER — TELEPHONE (OUTPATIENT)
Dept: FAMILY MEDICINE CLINIC | Age: 26
End: 2021-02-10

## 2021-02-10 NOTE — TELEPHONE ENCOUNTER
----- Message from [facility] sent at 2/9/2021  3:39 PM EST -----  Regarding: Dr. Hazel Andrade: [phone]  Patient return call    Caller's first and last name and relationship (if not the patient): N/A      Best contact number(s):[phone]      Whose call is being returned: Dr. Refugio Lopez      Details to clarify the request: F/U appointment from the ER.       [facility]
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
## 2021-02-11 ENCOUNTER — VIRTUAL VISIT (OUTPATIENT)
Dept: FAMILY MEDICINE CLINIC | Age: 26
End: 2021-02-11

## 2021-02-11 DIAGNOSIS — Z09 HOSPITAL DISCHARGE FOLLOW-UP: ICD-10-CM

## 2021-02-11 DIAGNOSIS — G44.309 POST-CONCUSSION HEADACHE: Primary | ICD-10-CM

## 2021-02-11 DIAGNOSIS — M25.531 RIGHT WRIST PAIN: ICD-10-CM

## 2021-02-11 PROCEDURE — 99213 OFFICE O/P EST LOW 20 MIN: CPT | Performed by: STUDENT IN AN ORGANIZED HEALTH CARE EDUCATION/TRAINING PROGRAM

## 2021-02-19 ENCOUNTER — OFFICE VISIT (OUTPATIENT)
Dept: FAMILY MEDICINE CLINIC | Age: 26
End: 2021-02-19

## 2021-02-19 VITALS
WEIGHT: 216 LBS | SYSTOLIC BLOOD PRESSURE: 111 MMHG | TEMPERATURE: 97.3 F | BODY MASS INDEX: 36.88 KG/M2 | HEIGHT: 64 IN | RESPIRATION RATE: 20 BRPM | OXYGEN SATURATION: 100 % | HEART RATE: 66 BPM | DIASTOLIC BLOOD PRESSURE: 66 MMHG

## 2021-02-19 DIAGNOSIS — S69.91XA INJURY OF RIGHT WRIST, INITIAL ENCOUNTER: ICD-10-CM

## 2021-02-19 DIAGNOSIS — M25.531 RIGHT WRIST PAIN: Primary | ICD-10-CM

## 2021-02-19 PROCEDURE — 99214 OFFICE O/P EST MOD 30 MIN: CPT | Performed by: FAMILY MEDICINE
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
## 2021-02-19 NOTE — PROGRESS NOTES
Chief Complaint   Patient presents with    Wrist Pain     follow up on right wrist pain, pain level 7/10      1. Have you been to the ER, urgent care clinic since your last visit? Hospitalized since your last visit? No    2. Have you seen or consulted any other health care providers outside of the [address] [place] [address] since your last visit? Include any pap smears or colon screening. No     Reviewed record in preparation for visit and have obtained necessary documentation.

## 2021-03-02 ENCOUNTER — TELEPHONE (OUTPATIENT)
Dept: FAMILY MEDICINE CLINIC | Age: 26
End: 2021-03-02

## 2021-03-02 DIAGNOSIS — S69.91XA INJURY OF RIGHT WRIST, INITIAL ENCOUNTER: ICD-10-CM

## 2021-03-02 DIAGNOSIS — M25.531 RIGHT WRIST PAIN: Primary | ICD-10-CM

## 2021-03-02 NOTE — TELEPHONE ENCOUNTER
Per call from [facility] with Scheduling dept, notes that order in connect care need to be changed from MRI UP EXT RT W WO CONT to MRI WRIST W/O CONT.           PT APPT 3/3/21 AT 8:30 AM      Questions call [phone]

## 2021-03-31 ENCOUNTER — HOSPITAL ENCOUNTER (EMERGENCY)
Age: 26
Discharge: HOME | End: 2021-03-31
Attending: STUDENT IN AN ORGANIZED HEALTH CARE EDUCATION/TRAINING PROGRAM

## 2021-03-31 ENCOUNTER — APPOINTMENT (OUTPATIENT)
Dept: GENERAL RADIOLOGY | Age: 26
End: 2021-03-31
Attending: EMERGENCY MEDICINE

## 2021-03-31 VITALS
SYSTOLIC BLOOD PRESSURE: 120 MMHG | HEIGHT: 65 IN | BODY MASS INDEX: 35.52 KG/M2 | WEIGHT: 213.19 LBS | DIASTOLIC BLOOD PRESSURE: 77 MMHG | HEART RATE: 74 BPM | TEMPERATURE: 97.7 F | OXYGEN SATURATION: 99 % | RESPIRATION RATE: 16 BRPM

## 2021-03-31 DIAGNOSIS — R05.9 COUGH: Primary | ICD-10-CM

## 2021-03-31 DIAGNOSIS — J30.89 ENVIRONMENTAL AND SEASONAL ALLERGIES: ICD-10-CM

## 2021-03-31 DIAGNOSIS — Z20.822 ENCOUNTER FOR LABORATORY TESTING FOR COVID-19 VIRUS: ICD-10-CM

## 2021-03-31 LAB — SARS-COV-2, COV2: NORMAL

## 2021-03-31 PROCEDURE — 71045 X-RAY EXAM CHEST 1 VIEW: CPT

## 2021-03-31 PROCEDURE — 93005 ELECTROCARDIOGRAM TRACING: CPT

## 2021-03-31 PROCEDURE — U0005 INFEC AGEN DETEC AMPLI PROBE: HCPCS

## 2021-03-31 PROCEDURE — 99282 EMERGENCY DEPT VISIT SF MDM: CPT

## 2021-03-31 RX ORDER — BENZONATATE 100 MG/1
100 CAPSULE ORAL
Qty: 30 CAP | Refills: 0 | Status: SHIPPED | OUTPATIENT
Start: 2021-03-31 | End: 2021-04-07

## 2021-03-31 RX ORDER — METHYLPREDNISOLONE 4 MG/1
TABLET ORAL
Qty: 1 DOSE PACK | Refills: 0 | Status: SHIPPED | OUTPATIENT
Start: 2021-03-31 | End: 2021-08-03

## 2021-03-31 NOTE — LETTER
[address] 
[facility] EMERGENCY DEPT 
[address] [facility] [id number] 
[phone] Work/School Note Date: 3/31/2021 To Whom It May concern: 
 
Paula Stanford was seen and treated today in the emergency room by the following provider(s): 
Physician Assistant: Kaye Lesch, PA.    
 
Sincerely, 
 
 
 
 
Amador Osullivan RN

## 2021-03-31 NOTE — ED TRIAGE NOTES
Reports chest pain and SOB starting Monday night. States hx of allergies. Patient when to [facility] and was diagnosed with bronchitis, prescribed Albuterol and Azythromycin. Patient denies N/V/D. Reports headache and  sore throat.

## 2021-03-31 NOTE — ED PROVIDER NOTES
Please note that this dictation was completed with [employer], the computer voice recognition software.  Quite often unanticipated grammatical, syntax, homophones, and other interpretive errors are inadvertently transcribed by the computer software.  Please disregard these errors.  Please excuse any errors that have escaped final proofreading. Patient is a 47-year-old otherwise healthy female presenting to emergency department for evaluation of cough and chest tightness with onset 2 days ago. Patient states that she was seen at an urgent care yesterday and diagnosed with bronchitis. Prescribed a Z-Moncho and given an albuterol inhaler. States that she is unable to sleep because she is coughing so much and that this is typically a problem for her during the spring season due to her underlying allergies. Takes daily antihistamines and Flonase. She denies fever, headache, lightheadedness, difficulty breathing, abdominal pain, nausea, vomiting, diarrhea, or any other medical complaints at this time. She has not been tested for COVID-19 and denies any known sick exposure. Past Medical History:   Diagnosis Date    Chlamydia     Migraines     Pelvic inflammatory disease (PID)        No past surgical history on file.       Family History:   Problem Relation Age of Onset    Ovarian Cancer Mother     Hypertension Maternal Grandmother     Diabetes Maternal Grandmother        Social History     Socioeconomic History    Marital status: SINGLE     Spouse name: Not on file    Number of children: Not on file    Years of education: Not on file    Highest education level: Not on file   Occupational History    Not on file   Social Needs    Financial resource strain: Not on file    Food insecurity     Worry: Not on file     Inability: Not on file    Transportation needs     Medical: Not on file     Non-medical: Not on file   Tobacco Use    Smoking status: Never Smoker    Smokeless tobacco: Never Used Substance and Sexual Activity    Alcohol use: Yes     Alcohol/week: 3.0 - 4.0 standard drinks     Types: 3 - 4 Shots of liquor per week    Drug use: No    Sexual activity: Yes     Partners: Male     Birth control/protection: I.U.D. Lifestyle    Physical activity     Days per week: Not on file     Minutes per session: Not on file    Stress: Not on file   Relationships    Social connections     Talks on phone: Not on file     Gets together: Not on file     Attends Quaker service: Not on file     Active member of club or organization: Not on file     Attends meetings of clubs or organizations: Not on file     Relationship status: Not on file    Intimate partner violence     Fear of current or ex partner: Not on file     Emotionally abused: Not on file     Physically abused: Not on file     Forced sexual activity: Not on file   Other Topics Concern    Not on file   Social History Narrative    Not on file         ALLERGIES: Chicken flavor and Other medication    Review of Systems   Constitutional: Negative for chills and fever. HENT: Negative for ear pain and sore throat. Eyes: Negative for visual disturbance. Respiratory: Positive for cough and chest tightness. Negative for shortness of breath and wheezing. Cardiovascular: Negative for chest pain. Gastrointestinal: Negative for abdominal pain, diarrhea, nausea and vomiting. Genitourinary: Negative for decreased urine volume, dysuria, flank pain and hematuria. Musculoskeletal: Negative for back pain. Skin: Negative for color change. Neurological: Negative for dizziness and headaches. Psychiatric/Behavioral: Negative for confusion. Vitals:    03/31/21 1303   BP: 120/77   Pulse: 74   Resp: 16   Temp: 97.7 °F (36.5 °C)   SpO2: 99%   Weight: 96.7 kg (213 lb 3 oz)   Height: 5' 5\" (1.651 m)            Physical Exam  Vitals signs and nursing note reviewed. Constitutional:       General: She is not in acute distress.      Appearance: Normal appearance. She is not ill-appearing. HENT:      Head: Normocephalic and atraumatic. Mouth/Throat:      Pharynx: Oropharynx is clear. Eyes:      Extraocular Movements: Extraocular movements intact. Conjunctiva/sclera: Conjunctivae normal.   Neck:      Musculoskeletal: No neck rigidity. Cardiovascular:      Rate and Rhythm: Normal rate and regular rhythm. Pulmonary:      Effort: Pulmonary effort is normal.      Breath sounds: Normal breath sounds. No decreased breath sounds, wheezing, rhonchi or rales. Chest:      Chest wall: Tenderness present. Abdominal:      Palpations: Abdomen is soft. Tenderness: There is no abdominal tenderness. Musculoskeletal: Normal range of motion. Skin:     General: Skin is warm and dry. Neurological:      General: No focal deficit present. Mental Status: She is alert and oriented to person, place, and time. Psychiatric:         Mood and Affect: Mood normal.          MDM  Number of Diagnoses or Management Options  Cough  Encounter for laboratory testing for COVID-19 virus  Environmental and seasonal allergies  Diagnosis management comments: Patient is alert, well-appearing, afebrile, vital stable. Oxygen saturation 99% room air with unlabored breathing. Complains of persistent unproductive cough and chest tightness x2 days. Upper respiratory versus seasonal allergy symptoms x2 days, chest pain reproducible on exam and is likely secondary to musculoskeletal pain from coughing. Patient is ambulatory in the emergency department without signs of shortness of breath. Lungs are clear to auscultation bilaterally. Patient ambulated with myself, no hypoxia or clinical changes. Chest x-ray clear. Discharge COVID-19 test pending. Patient is informed of findings and is discharged home with strict return precautions return to emergency department with any chest pain, dizziness, shortness of breath, or any other severe symptoms.   Patient advised to continue azithromycin, albuterol inhaler as needed, daily allergy medications, and to add Tessalon Perles and Medrol Dosepak. Patient given information on respiratory isolation and current CDC guidelines. All questions answered at this time. Amount and/or Complexity of Data Reviewed  Tests in the radiology section of CPT®: reviewed      3:04 PM  Pt has been reevaluated. There are no new complaints, changes, or physical findings at this time. Medications have been reviewed w/ pt and/or family. Pt and/or family's questions have been answered. Pt and/or family expressed good understanding of the dx/tx/rx and is in agreement with plan of care. Pt instructed and agreed to f/u w/ PCP and to return to ED upon further deterioration. Pt is ready for discharge. IMPRESSION:  1. Cough    2. Environmental and seasonal allergies    3. Encounter for laboratory testing for COVID-19 virus        PLAN:  1. Discharge Medication List as of 3/31/2021  2:43 PM      START taking these medications    Details   benzonatate (Tessalon Perles) 100 mg capsule Take 1 Cap by mouth three (3) times daily as needed for Cough for up to 7 days. , Normal, Disp-30 Cap, R-0      methylPREDNISolone (Medrol, Moncho,) 4 mg tablet Follow instructions on packaging, Normal, Disp-1 Dose Pack, R-0         CONTINUE these medications which have NOT CHANGED    Details   naproxen (NAPROSYN) 250 mg tablet TAKE 1 TABLET BY MOUTH TWICE DAILY AS NEEDED FOR HEADACHE and wrist pain, Normal, Disp-60 Tab, R-0      SUMAtriptan (IMITREX) 50 mg tablet Take one dose as need for head ache. Can take an additional dose after 2 hours if symptoms don't improve. MAX 100mg in one day, Normal, Disp-30 Tab, R-0      cyclobenzaprine (FLEXERIL) 5 mg tablet Take 1 Tab by mouth three (3) times daily as needed for Muscle Spasm(s). , Normal, Disp-42 Tab,R-0      butalbital-acetaminophen-caffeine (FIORICET, ESGIC) -40 mg per tablet TK 1 T PO  Q 4 H PRN HEADACHE, Historical Med      meclizine (ANTIVERT) 25 mg tablet Take 1 Tab by mouth three (3) times daily as needed for Dizziness., Normal, Disp-90 Tab,R-0      fluticasone propionate (FLONASE) 50 mcg/actuation nasal spray 2 Sprays by Both Nostrils route two (2) times a day., Normal, Disp-1 Bottle,R-0      albuterol (PROVENTIL HFA, VENTOLIN HFA, PROAIR HFA) 90 mcg/actuation inhaler TAKE 1 TO 2 PUFFS BY MOUTH EVERY 4 TO 6 HOURS AS NEEDED FOR DYSPNEA/WHEEZING, Historical Med, R-0      acetaminophen (TYLENOL EXTRA STRENGTH) 500 mg tablet Take 500 mg by mouth as needed for Pain., Historical Med      multivitamin (ONE A DAY) tablet Take 1 Tab by mouth daily. , Historical Med      levonorgestrel (KYLEENA) 17.5 mcg/24 hr (5 years) IUD IUD 1 Each by IntraUTERine route once., Historical Med           2.    Follow-up Information     Follow up With Specialties Details Why Contact Info    Tarun Donnelly MD [facility] Medicine Schedule an appointment as soon as possible for a visit   [address] [place] [address] [facility]   [phone]              Return to ED if worse          Procedures

## 2021-03-31 NOTE — ED NOTES

## 2021-04-01 ENCOUNTER — PATIENT OUTREACH (OUTPATIENT)
Dept: CASE MANAGEMENT | Age: 26
End: 2021-04-01

## 2021-04-01 LAB
SARS-COV-2, XPLCVT: NOT DETECTED
SOURCE, COVRS: NORMAL

## 2021-04-01 NOTE — PROGRESS NOTES
Patient contacted regarding recent discharge and COVID-19 risk. Discussed COVID-19 related testing which was pending at this time. Test results were pending. Patient informed of results, if available? no    Outreach made within 2 business days of discharge: Yes    Care Transition Nurse/ Ambulatory Care Manager/ LPN Care Coordinator contacted the patient by telephone to perform post discharge assessment. Verified name and  with patient as identifiers. Patient has following risk factors of: no known risk factors. CTN/ACM/LPN reviewed discharge instructions, medical action plan and red flags related to discharge diagnosis. Reviewed and educated them on any new and changed medications related to discharge diagnosis. Advised obtaining a 90-day supply of all daily and as-needed medications. Advance Care Planning:   Does patient have an Advance Directive: health care decision makers updated    Education provided regarding infection prevention, and signs and symptoms of COVID-19 and when to seek medical attention with patient who verbalized understanding. Discussed exposure protocols and quarantine from [address] you at higher risk for severe illness  and given an opportunity for questions and concerns. The patient agrees to contact the COVID-19 hotline [phone] or PCP office for questions related to their healthcare. CTN/ACM/LPN provided contact information for future reference. From CDC: Are you at higher risk for severe illness?  Wash your hands often.  Avoid close contact (6 feet, which is about two arm lengths) with people who are sick.  Put distance between yourself and other people if COVID-19 is spreading in your community.  Clean and disinfect frequently touched surfaces.  Avoid all cruise travel and non-essential air travel.  Call your healthcare professional if you have concerns about COVID-19 and your underlying condition or if you are sick.     For more information on steps you can take to protect yourself, see CDC's How to Protect Yourself      Patient/family/caregiver given information for GetWell Loop and agrees to enroll yes  Patient's preferred e-mail: [email or web address]   Patient's preferred phone number: [phone]  Based on Loop alert triggers, patient will be contacted by nurse care manager for worsening symptoms. Pt will be further monitored by COVID Loop Team, pending account activation by patient.  based on severity of symptoms and risk factors.

## 2021-04-02 ENCOUNTER — TELEPHONE (OUTPATIENT)
Dept: FAMILY MEDICINE CLINIC | Age: 26
End: 2021-04-02

## 2021-04-02 ENCOUNTER — VIRTUAL VISIT (OUTPATIENT)
Dept: FAMILY MEDICINE CLINIC | Age: 26
End: 2021-04-02

## 2021-04-02 DIAGNOSIS — J40 BRONCHITIS: Primary | ICD-10-CM

## 2021-04-02 LAB
ATRIAL RATE: 72 BPM
CALCULATED P AXIS, ECG09: 19 DEGREES
CALCULATED R AXIS, ECG10: 1 DEGREES
DIAGNOSIS, 93000: NORMAL
P-R INTERVAL, ECG05: 134 MS
Q-T INTERVAL, ECG07: 374 MS
QRS DURATION, ECG06: 84 MS
QTC CALCULATION (BEZET), ECG08: 409 MS
VENTRICULAR RATE, ECG03: 72 BPM

## 2021-04-02 PROCEDURE — 99213 OFFICE O/P EST LOW 20 MIN: CPT | Performed by: STUDENT IN AN ORGANIZED HEALTH CARE EDUCATION/TRAINING PROGRAM

## 2021-04-02 NOTE — PROGRESS NOTES
Travis Leslie  30 y.o. female  1995  [address]  [id number]   [address]:    Telemedicine Progress Note  Elva Hodgkins, MD       Encounter Date and Time: April 2, 2021 at 1:43 PM    Consent: Travis Leslie, who was seen by synchronous (real-time) audio-video technology, and/or her healthcare decision maker, is aware that this patient-initiated, Telehealth encounter on 4/2/2021 is a billable service, with coverage as determined by her insurance carrier. She is aware that she may receive a bill and has provided verbal consent to proceed: Yes. Chief Complaint   Patient presents with    Headache    Nasal Discharge     History of Present Illness   Travis Leslie is a 30 y.o. female was evaluated by synchronous (real-time) audio-video technology from home, through a secure patient portal.    ER follow up  Patient states she has allergies almost all year and gets sick once a year with a URI. Her seasonal allergies began to worsen 3 weeks ago and she developed a runny nose. She uses a nasal spray. 5 days ago her symptoms worsened and she developed sweating/chills and a pressure on her chest at night. She also felt very fatigued, sore throat and a headache. She was seen at [facility] on (03/30) where she was told it was bronchitis. Xray was clear. She was given Azithromycin 5 days and Albuterol q4hrs. The next day her symptoms persisted and she presented to the ER for cough and chest tighness. Xray again was clear, she was given a Solumedrol pack and tessalon perles. She tested negative for covid. She was given a letter for work for 10 day excuse until she feels better. Today she still is congested with a sore throat but the cough has improved. She last felt a fever yesterday. She is trying to eat but drinking more liquids. Has been taking all her medications as prescribed and Albuterol every 4 hours.      Review of Systems   Review of Systems Constitutional: Positive for fever. Negative for chills. Respiratory: Positive for cough. Cardiovascular: Negative for chest pain. Vitals/Objective:     General: alert, cooperative, no distress   Mental  status: mental status: alert, oriented to person, place, and time, normal mood, behavior, speech, dress, motor activity, and thought processes   Resp: resp: normal effort and no respiratory distress   Neuro: neuro: no gross deficits   Skin: skin: no discoloration or lesions of concern on visible areas   Due to this being a TeleHealth evaluation, many elements of the physical examination are unable to be assessed. Assessment and Plan:   Brooke Howell is a 30 y.o. female who presents for a URI. 1. Bronchitis  - Reviewed ER notes and patient's medications over the video  - Patient is improving, will finish her medications and return to care if symptoms worsen or do not improve  - We will have a follow up visit in 1 week prior to her returning to work  - Discussed possibility of Allergist referral after she recovers for an evaluation     We discussed the expected course, resolution and complications of the diagnosis(es) in detail. Medication risks, benefits, costs, interactions, and alternatives were discussed as indicated. I advised her to contact the office if her condition worsens, changes or fails to improve as anticipated. She expressed understanding with the diagnosis(es) and plan. Patient understands that this encounter was a temporary measure, and the importance of further follow up and examination was emphasized. Patient verbalized understanding. Patient informed to follow up: 1 week . Electronically Signed: Vannessa Ribera MD    Brooke Howell is a 30 y.o. female who was evaluated by an audio-video encounter for concerns as above. Patient identification was verified prior to start of the visit. A caregiver was present when appropriate.  Due to this being a TeleHealth encounter (During MZMBK-56 public health emergency), evaluation of the following organ systems was limited: Vitals/Constitutional/EENT/Resp/CV/GI//MS/Neuro/Skin/Heme-Lymph-Imm. Pursuant to the emergency declaration under the [facility], [facility] waiver authority and the [employer] Act, this Virtual Visit was conducted, with patient's (and/or legal guardian's) consent, to reduce the patient's risk of exposure to COVID-19 and provide necessary medical care. Services were provided through a synchronous discussion virtually to substitute for in-person clinic visit. I was at home. The patient was at home. History   Patients past medical, surgical and family histories were reviewed and updated. Past Medical History:   Diagnosis Date    Chlamydia     Migraines     Pelvic inflammatory disease (PID)      No past surgical history on file. Family History   Problem Relation Age of Onset    Ovarian Cancer Mother     Hypertension Maternal Grandmother     Diabetes Maternal Grandmother      Social History     Tobacco Use    Smoking status: Never Smoker    Smokeless tobacco: Never Used   Substance Use Topics    Alcohol use: Yes     Alcohol/week: 3.0 - 4.0 standard drinks     Types: 3 - 4 Shots of liquor per week    Drug use: No     Patient Active Problem List   Diagnosis Code    Migraine G43.909    Severe obesity (HCC) E66.01          Current Medications/Allergies   Medications and Allergies reviewed:    Current Outpatient Medications   Medication Sig Dispense Refill    benzonatate (Tessalon Perles) 100 mg capsule Take 1 Cap by mouth three (3) times daily as needed for Cough for up to 7 days.  30 Cap 0    methylPREDNISolone (Medrol, Moncho,) 4 mg tablet Follow instructions on packaging 1 Dose Pack 0    naproxen (NAPROSYN) 250 mg tablet TAKE 1 TABLET BY MOUTH TWICE DAILY AS NEEDED FOR HEADACHE and wrist pain 60 Tab 0    SUMAtriptan (IMITREX) 50 mg tablet Take one dose as need for head ache. Can take an additional dose after 2 hours if symptoms don't improve. MAX 100mg in one day 30 Tab 0    cyclobenzaprine (FLEXERIL) 5 mg tablet Take 1 Tab by mouth three (3) times daily as needed for Muscle Spasm(s). 42 Tab 0    butalbital-acetaminophen-caffeine (FIORICET, ESGIC) -40 mg per tablet TK 1 T PO  Q 4 H PRN HEADACHE      meclizine (ANTIVERT) 25 mg tablet Take 1 Tab by mouth three (3) times daily as needed for Dizziness. 90 Tab 0    fluticasone propionate (FLONASE) 50 mcg/actuation nasal spray 2 Sprays by Both Nostrils route two (2) times a day. 1 Bottle 0    albuterol (PROVENTIL HFA, VENTOLIN HFA, PROAIR HFA) 90 mcg/actuation inhaler TAKE 1 TO 2 PUFFS BY MOUTH EVERY 4 TO 6 HOURS AS NEEDED FOR DYSPNEA/WHEEZING  0    acetaminophen (TYLENOL EXTRA STRENGTH) 500 mg tablet Take 500 mg by mouth as needed for Pain.  multivitamin (ONE A DAY) tablet Take 1 Tab by mouth daily.  levonorgestrel (KYLEENA) 17.5 mcg/24 hr (5 years) IUD IUD 1 Each by IntraUTERine route once.        Allergies   Allergen Reactions    Chicken Flavor Itching    Other Medication Hives     Ham

## 2021-04-02 NOTE — TELEPHONE ENCOUNTER
----- Message from Adrian Paulson sent at 3/31/2021 11:42 AM EDT -----  Regarding: /Telephone  General Message/Vendor Calls    Caller's first and last name:self      Reason for call:Pt advised wanting to talk to office about her bronchitis.       Callback required yes/no and why:yes, to clarify      Best contact number(s):[phone]      Details to clarify the request:N/A      Adrianpeter Paulson

## 2021-04-02 NOTE — Clinical Note
Virtual follow up on 04/09 with me please \"bronchitis follow up\"   Patient states any time, can send her a [employer] message with appt confirmation  Thank you

## 2021-04-23 ENCOUNTER — TRANSCRIBE ORDER (OUTPATIENT)
Dept: SCHEDULING | Age: 26
End: 2021-04-23

## 2021-04-23 DIAGNOSIS — M25.561 KNEE PAIN, RIGHT ANTERIOR: Primary | ICD-10-CM

## 2021-04-27 ENCOUNTER — HOSPITAL ENCOUNTER (OUTPATIENT)
Dept: MRI IMAGING | Age: 26
Discharge: HOME | End: 2021-04-27
Attending: ORTHOPAEDIC SURGERY

## 2021-04-27 DIAGNOSIS — M25.561 KNEE PAIN, RIGHT ANTERIOR: ICD-10-CM

## 2021-04-27 PROCEDURE — 73721 MRI JNT OF LWR EXTRE W/O DYE: CPT

## 2021-08-03 ENCOUNTER — HOSPITAL ENCOUNTER (INPATIENT)
Age: 26
LOS: 3 days | Discharge: HOME | DRG: 759 | End: 2021-08-06
Attending: STUDENT IN AN ORGANIZED HEALTH CARE EDUCATION/TRAINING PROGRAM | Admitting: OBSTETRICS & GYNECOLOGY
Payer: MEDICAID

## 2021-08-03 ENCOUNTER — APPOINTMENT (OUTPATIENT)
Dept: ULTRASOUND IMAGING | Age: 26
DRG: 759 | End: 2021-08-03
Attending: EMERGENCY MEDICINE
Payer: MEDICAID

## 2021-08-03 ENCOUNTER — APPOINTMENT (OUTPATIENT)
Dept: CT IMAGING | Age: 26
DRG: 759 | End: 2021-08-03
Attending: STUDENT IN AN ORGANIZED HEALTH CARE EDUCATION/TRAINING PROGRAM
Payer: MEDICAID

## 2021-08-03 DIAGNOSIS — E66.01 SEVERE OBESITY (HCC): ICD-10-CM

## 2021-08-03 DIAGNOSIS — R10.32 ABDOMINAL PAIN, LEFT LOWER QUADRANT: ICD-10-CM

## 2021-08-03 DIAGNOSIS — N76.0 BV (BACTERIAL VAGINOSIS): ICD-10-CM

## 2021-08-03 DIAGNOSIS — R10.2 PELVIC PAIN: Primary | ICD-10-CM

## 2021-08-03 DIAGNOSIS — G89.18 POSTOPERATIVE PAIN: ICD-10-CM

## 2021-08-03 DIAGNOSIS — B96.89 BV (BACTERIAL VAGINOSIS): ICD-10-CM

## 2021-08-03 PROBLEM — N73.0: Status: ACTIVE | Noted: 2021-08-03

## 2021-08-03 PROBLEM — R10.31 ABDOMINAL PAIN, RIGHT LOWER QUADRANT: Status: ACTIVE | Noted: 2021-08-03

## 2021-08-03 PROBLEM — N92.1 IRREGULAR INTERMENSTRUAL BLEEDING: Status: ACTIVE | Noted: 2021-08-03

## 2021-08-03 LAB
ALBUMIN SERPL-MCNC: 3.9 G/DL (ref 3.5–5)
ALBUMIN/GLOB SERPL: 0.9 {RATIO} (ref 1.1–2.2)
ALP SERPL-CCNC: 96 U/L (ref 45–117)
ALT SERPL-CCNC: 22 U/L (ref 12–78)
ANION GAP SERPL CALC-SCNC: 6 MMOL/L (ref 5–15)
APPEARANCE UR: ABNORMAL
AST SERPL-CCNC: 12 U/L (ref 15–37)
BACTERIA URNS QL MICRO: NEGATIVE /HPF
BASOPHILS # BLD: 0.1 K/UL (ref 0–0.1)
BASOPHILS NFR BLD: 1 % (ref 0–1)
BILIRUB SERPL-MCNC: 0.5 MG/DL (ref 0.2–1)
BILIRUB UR QL: NEGATIVE
BUN SERPL-MCNC: 4 MG/DL (ref 6–20)
BUN/CREAT SERPL: 6 (ref 12–20)
CALCIUM SERPL-MCNC: 8.6 MG/DL (ref 8.5–10.1)
CHLORIDE SERPL-SCNC: 108 MMOL/L (ref 97–108)
CLUE CELLS VAG QL WET PREP: NORMAL
CO2 SERPL-SCNC: 25 MMOL/L (ref 21–32)
COLOR UR: ABNORMAL
COMMENT, HOLDF: NORMAL
CREAT SERPL-MCNC: 0.66 MG/DL (ref 0.55–1.02)
DIFFERENTIAL METHOD BLD: NORMAL
EOSINOPHIL # BLD: 0.3 K/UL (ref 0–0.4)
EOSINOPHIL NFR BLD: 3 % (ref 0–7)
EPITH CASTS URNS QL MICRO: ABNORMAL /LPF
ERYTHROCYTE [DISTWIDTH] IN BLOOD BY AUTOMATED COUNT: 12.9 % (ref 11.5–14.5)
GLOBULIN SER CALC-MCNC: 4.2 G/DL (ref 2–4)
GLUCOSE SERPL-MCNC: 89 MG/DL (ref 65–100)
GLUCOSE UR STRIP.AUTO-MCNC: NEGATIVE MG/DL
HCG UR QL: NEGATIVE
HCT VFR BLD AUTO: 44.8 % (ref 35–47)
HGB BLD-MCNC: 14.9 G/DL (ref 11.5–16)
HGB UR QL STRIP: NEGATIVE
IMM GRANULOCYTES # BLD AUTO: 0 K/UL (ref 0–0.04)
IMM GRANULOCYTES NFR BLD AUTO: 0 % (ref 0–0.5)
KETONES UR QL STRIP.AUTO: NEGATIVE MG/DL
KOH PREP SPEC: NORMAL
LEUKOCYTE ESTERASE UR QL STRIP.AUTO: ABNORMAL
LIPASE SERPL-CCNC: 92 U/L (ref 73–393)
LYMPHOCYTES # BLD: 2.3 K/UL (ref 0.8–3.5)
LYMPHOCYTES NFR BLD: 30 % (ref 12–49)
MCH RBC QN AUTO: 31.6 PG (ref 26–34)
MCHC RBC AUTO-ENTMCNC: 33.3 G/DL (ref 30–36.5)
MCV RBC AUTO: 95.1 FL (ref 80–99)
MONOCYTES # BLD: 0.6 K/UL (ref 0–1)
MONOCYTES NFR BLD: 8 % (ref 5–13)
NEUTS SEG # BLD: 4.5 K/UL (ref 1.8–8)
NEUTS SEG NFR BLD: 58 % (ref 32–75)
NITRITE UR QL STRIP.AUTO: NEGATIVE
NRBC # BLD: 0 K/UL (ref 0–0.01)
NRBC BLD-RTO: 0 PER 100 WBC
PH UR STRIP: 6.5 [PH] (ref 5–8)
PLATELET # BLD AUTO: 240 K/UL (ref 150–400)
PMV BLD AUTO: 11.5 FL (ref 8.9–12.9)
POTASSIUM SERPL-SCNC: 3.9 MMOL/L (ref 3.5–5.1)
PROT SERPL-MCNC: 8.1 G/DL (ref 6.4–8.2)
PROT UR STRIP-MCNC: NEGATIVE MG/DL
RBC # BLD AUTO: 4.71 M/UL (ref 3.8–5.2)
RBC #/AREA URNS HPF: ABNORMAL /HPF (ref 0–5)
SAMPLES BEING HELD,HOLD: NORMAL
SERVICE CMNT-IMP: NORMAL
SODIUM SERPL-SCNC: 139 MMOL/L (ref 136–145)
SP GR UR REFRACTOMETRY: 1.02 (ref 1–1.03)
T VAGINALIS VAG QL WET PREP: NORMAL
UR CULT HOLD, URHOLD: NORMAL
UROBILINOGEN UR QL STRIP.AUTO: 0.2 EU/DL (ref 0.2–1)
WBC # BLD AUTO: 7.8 K/UL (ref 3.6–11)
WBC URNS QL MICRO: ABNORMAL /HPF (ref 0–4)

## 2021-08-03 PROCEDURE — 81025 URINE PREGNANCY TEST: CPT

## 2021-08-03 PROCEDURE — 87210 SMEAR WET MOUNT SALINE/INK: CPT

## 2021-08-03 PROCEDURE — 74011250636 HC RX REV CODE- 250/636: Performed by: STUDENT IN AN ORGANIZED HEALTH CARE EDUCATION/TRAINING PROGRAM

## 2021-08-03 PROCEDURE — 74011250637 HC RX REV CODE- 250/637: Performed by: STUDENT IN AN ORGANIZED HEALTH CARE EDUCATION/TRAINING PROGRAM

## 2021-08-03 PROCEDURE — 74177 CT ABD & PELVIS W/CONTRAST: CPT

## 2021-08-03 PROCEDURE — 36415 COLL VENOUS BLD VENIPUNCTURE: CPT

## 2021-08-03 PROCEDURE — 74011000636 HC RX REV CODE- 636: Performed by: RADIOLOGY

## 2021-08-03 PROCEDURE — 96376 TX/PRO/DX INJ SAME DRUG ADON: CPT

## 2021-08-03 PROCEDURE — 76856 US EXAM PELVIC COMPLETE: CPT

## 2021-08-03 PROCEDURE — 87086 URINE CULTURE/COLONY COUNT: CPT

## 2021-08-03 PROCEDURE — 65270000029 HC RM PRIVATE

## 2021-08-03 PROCEDURE — 96375 TX/PRO/DX INJ NEW DRUG ADDON: CPT

## 2021-08-03 PROCEDURE — 74011000250 HC RX REV CODE- 250: Performed by: STUDENT IN AN ORGANIZED HEALTH CARE EDUCATION/TRAINING PROGRAM

## 2021-08-03 PROCEDURE — 83690 ASSAY OF LIPASE: CPT

## 2021-08-03 PROCEDURE — 99285 EMERGENCY DEPT VISIT HI MDM: CPT

## 2021-08-03 PROCEDURE — 96374 THER/PROPH/DIAG INJ IV PUSH: CPT

## 2021-08-03 PROCEDURE — 81001 URINALYSIS AUTO W/SCOPE: CPT

## 2021-08-03 PROCEDURE — 96372 THER/PROPH/DIAG INJ SC/IM: CPT

## 2021-08-03 PROCEDURE — 87591 N.GONORRHOEAE DNA AMP PROB: CPT

## 2021-08-03 PROCEDURE — 80053 COMPREHEN METABOLIC PANEL: CPT

## 2021-08-03 PROCEDURE — 76830 TRANSVAGINAL US NON-OB: CPT

## 2021-08-03 PROCEDURE — 85025 COMPLETE CBC W/AUTO DIFF WBC: CPT

## 2021-08-03 RX ORDER — ONDANSETRON 2 MG/ML
4 INJECTION INTRAMUSCULAR; INTRAVENOUS
Status: COMPLETED | OUTPATIENT
Start: 2021-08-03 | End: 2021-08-03

## 2021-08-03 RX ORDER — HYDROMORPHONE HYDROCHLORIDE 1 MG/ML
0.5 INJECTION, SOLUTION INTRAMUSCULAR; INTRAVENOUS; SUBCUTANEOUS ONCE
Status: COMPLETED | OUTPATIENT
Start: 2021-08-03 | End: 2021-08-03

## 2021-08-03 RX ORDER — KETOROLAC TROMETHAMINE 30 MG/ML
30 INJECTION, SOLUTION INTRAMUSCULAR; INTRAVENOUS
Status: COMPLETED | OUTPATIENT
Start: 2021-08-03 | End: 2021-08-03

## 2021-08-03 RX ORDER — METRONIDAZOLE 250 MG/1
500 TABLET ORAL 2 TIMES DAILY WITH MEALS
Status: DISCONTINUED | OUTPATIENT
Start: 2021-08-04 | End: 2021-08-06 | Stop reason: HOSPADM

## 2021-08-03 RX ORDER — ONDANSETRON 2 MG/ML
4 INJECTION INTRAMUSCULAR; INTRAVENOUS
Status: DISCONTINUED | OUTPATIENT
Start: 2021-08-03 | End: 2021-08-06 | Stop reason: HOSPADM

## 2021-08-03 RX ORDER — SODIUM CHLORIDE, SODIUM LACTATE, POTASSIUM CHLORIDE, CALCIUM CHLORIDE 600; 310; 30; 20 MG/100ML; MG/100ML; MG/100ML; MG/100ML
125 INJECTION, SOLUTION INTRAVENOUS CONTINUOUS
Status: DISCONTINUED | OUTPATIENT
Start: 2021-08-03 | End: 2021-08-06 | Stop reason: HOSPADM

## 2021-08-03 RX ORDER — METRONIDAZOLE 250 MG/1
500 TABLET ORAL
Status: COMPLETED | OUTPATIENT
Start: 2021-08-03 | End: 2021-08-03

## 2021-08-03 RX ORDER — DOXYCYCLINE HYCLATE 100 MG
100 TABLET ORAL EVERY 12 HOURS
Status: DISCONTINUED | OUTPATIENT
Start: 2021-08-03 | End: 2021-08-03 | Stop reason: SDUPTHER

## 2021-08-03 RX ORDER — KETOROLAC TROMETHAMINE 30 MG/ML
30 INJECTION, SOLUTION INTRAMUSCULAR; INTRAVENOUS
Status: DISCONTINUED | OUTPATIENT
Start: 2021-08-03 | End: 2021-08-03

## 2021-08-03 RX ORDER — ALBUTEROL SULFATE 0.83 MG/ML
2.5 SOLUTION RESPIRATORY (INHALATION)
Status: DISCONTINUED | OUTPATIENT
Start: 2021-08-03 | End: 2021-08-06 | Stop reason: HOSPADM

## 2021-08-03 RX ORDER — HYDROMORPHONE HYDROCHLORIDE 1 MG/ML
0.5 INJECTION, SOLUTION INTRAMUSCULAR; INTRAVENOUS; SUBCUTANEOUS
Status: COMPLETED | OUTPATIENT
Start: 2021-08-03 | End: 2021-08-03

## 2021-08-03 RX ORDER — DOXYCYCLINE HYCLATE 100 MG
100 TABLET ORAL 2 TIMES DAILY WITH MEALS
Status: DISCONTINUED | OUTPATIENT
Start: 2021-08-03 | End: 2021-08-06 | Stop reason: HOSPADM

## 2021-08-03 RX ORDER — ACETAMINOPHEN 500 MG
1000 TABLET ORAL
Status: COMPLETED | OUTPATIENT
Start: 2021-08-03 | End: 2021-08-03

## 2021-08-03 RX ORDER — ACETAMINOPHEN 325 MG/1
650 TABLET ORAL
Status: DISCONTINUED | OUTPATIENT
Start: 2021-08-03 | End: 2021-08-06 | Stop reason: HOSPADM

## 2021-08-03 RX ORDER — OXYCODONE HYDROCHLORIDE 5 MG/1
5 TABLET ORAL
Status: DISCONTINUED | OUTPATIENT
Start: 2021-08-03 | End: 2021-08-06 | Stop reason: HOSPADM

## 2021-08-03 RX ORDER — OXYCODONE HYDROCHLORIDE 5 MG/1
10 TABLET ORAL
Status: DISCONTINUED | OUTPATIENT
Start: 2021-08-03 | End: 2021-08-06 | Stop reason: HOSPADM

## 2021-08-03 RX ORDER — KETOROLAC TROMETHAMINE 30 MG/ML
30 INJECTION, SOLUTION INTRAMUSCULAR; INTRAVENOUS EVERY 6 HOURS
Status: DISCONTINUED | OUTPATIENT
Start: 2021-08-04 | End: 2021-08-06 | Stop reason: HOSPADM

## 2021-08-03 RX ADMIN — ACETAMINOPHEN 1000 MG: 500 TABLET ORAL at 15:40

## 2021-08-03 RX ADMIN — LIDOCAINE HYDROCHLORIDE 500 MG: 10 INJECTION, SOLUTION EPIDURAL; INFILTRATION; INTRACAUDAL; PERINEURAL at 20:38

## 2021-08-03 RX ADMIN — DOXYCYCLINE HYCLATE 100 MG: 100 TABLET, COATED ORAL at 20:50

## 2021-08-03 RX ADMIN — SODIUM CHLORIDE 1000 ML: 9 INJECTION, SOLUTION INTRAVENOUS at 15:41

## 2021-08-03 RX ADMIN — ONDANSETRON 4 MG: 2 INJECTION INTRAMUSCULAR; INTRAVENOUS at 15:40

## 2021-08-03 RX ADMIN — HYDROMORPHONE HYDROCHLORIDE 0.5 MG: 1 INJECTION, SOLUTION INTRAMUSCULAR; INTRAVENOUS; SUBCUTANEOUS at 20:36

## 2021-08-03 RX ADMIN — IOPAMIDOL 100 ML: 755 INJECTION, SOLUTION INTRAVENOUS at 20:26

## 2021-08-03 RX ADMIN — HYDROMORPHONE HYDROCHLORIDE 0.5 MG: 1 INJECTION, SOLUTION INTRAMUSCULAR; INTRAVENOUS; SUBCUTANEOUS at 17:59

## 2021-08-03 RX ADMIN — KETOROLAC TROMETHAMINE 30 MG: 30 INJECTION, SOLUTION INTRAMUSCULAR; INTRAVENOUS at 17:59

## 2021-08-03 RX ADMIN — METRONIDAZOLE 500 MG: 250 TABLET ORAL at 20:37

## 2021-08-03 NOTE — ED TRIAGE NOTES
Pt presents with abdominal pain with bleeding every week for 2-4 days for 4 weeks. Pt has a IUD in for 3 years. Pain sometimes radiates to her back. Vomiting, fatigue, dizziness, and headache since yesterday.

## 2021-08-03 NOTE — ED PROVIDER NOTES
Chief Complaint   Patient presents with    Vaginal Bleeding    Abdominal Pain     This is a 25-year-old female with a history of migraine headaches presenting with vaginal bleeding for the past four weeks which started a week after her period ended (last week of June). She had an IUD placed 3 years ago, has never had dysfunctional uterine bleeding in the past, did not see her OB/GYN as she felt her symptoms initially were not severe enough to necessitate seeking care however more recently she had had worsening headaches, nausea, and abdominal cramping. Reports some lower back pain diffusely as well. She's had subjective fevers at home, broke out in a sweat while sleeping last night, continues to feel nauseous and have cramping despite using Motrin several hours prior to arrival.  No dysuria or abnormal vaginal discharge. She does not take any anticoagulants. No other systemic complaints. Symptoms are moderate in nature without alleviating factors. Past Medical History:   Diagnosis Date    Chlamydia     Migraines     Pelvic inflammatory disease (PID)        No past surgical history on file. Family History:   Problem Relation Age of Onset    Ovarian Cancer Mother     Hypertension Maternal Grandmother     Diabetes Maternal Grandmother        Social History     Socioeconomic History    Marital status: SINGLE     Spouse name: Not on file    Number of children: Not on file    Years of education: Not on file    Highest education level: Not on file   Occupational History    Not on file   Tobacco Use    Smoking status: Never Smoker    Smokeless tobacco: Never Used   Substance and Sexual Activity    Alcohol use: Yes     Alcohol/week: 3.0 - 4.0 standard drinks     Types: 3 - 4 Shots of liquor per week    Drug use: No    Sexual activity: Yes     Partners: Male     Birth control/protection: I.U.D.    Other Topics Concern    Not on file   Social History Narrative    Not on file     Social Determinants of Health     Financial Resource Strain:     Difficulty of Paying Living Expenses:    Food Insecurity:     Worried About Running Out of Food in the Last Year:     [address] in the Last Year:    Transportation Needs:     Lack of Transportation (Medical):  Lack of Transportation (Non-Medical):    Physical Activity:     Days of Exercise per Week:     Minutes of Exercise per Session:    Stress:     Feeling of Stress :    Social Connections:     Frequency of Communication with Friends and Family:     Frequency of Social Gatherings with Friends and Family:     Attends Episcopalian Services:     Active Member of Clubs or Organizations:     Attends Club or Organization Meetings:     Marital Status:    Intimate Partner Violence:     Fear of Current or Ex-Partner:     Emotionally Abused:     Physically Abused:     Sexually Abused: ALLERGIES: Chicken flavor and Other medication    Review of Systems   Constitutional: Positive for diaphoresis and fever. HENT: Negative for facial swelling. Eyes: Negative for redness. Respiratory: Negative for shortness of breath. Cardiovascular: Negative for chest pain. Gastrointestinal: Positive for abdominal pain. Genitourinary: Positive for vaginal bleeding. Negative for dysuria. Musculoskeletal: Positive for back pain. Neurological: Positive for weakness and headaches. Psychiatric/Behavioral: Negative for confusion.        Vitals:    08/03/21 1444 08/03/21 1658   BP: 119/74    Pulse: 69    Resp: 16    Temp: 97.5 °F (36.4 °C)    SpO2: 100% 100%   Weight: 95.3 kg (210 lb)    Height: 5' 5\" (1.651 m)             Physical Exam   General:  Awake and alert, NAD  HEENT:  NC/AT, equal pupils, moist mucous membranes  Neck:   Normal inspection, full range of motion  Cardiac:  RRR, no murmurs  Respiratory:  Clear bilaterally, no wheezes, rales, rhonchi  Abdomen:  Soft and nondistended, +tenderness across the lower abdomen without guarding  Back:   Normal range of motion, +tender across the paraspinal musculature in the inferior lumbar region throughout  Pelvic:  +Normal external inspection, copious mucopurulent discharge, unable to visualize cervix, mild CMT without adnexal tenderness  Extremities: Warm and well perfused, no peripheral edema  Neuro:  Moving all extremities symmetrically without gross motor deficit  Skin:   No rashes or pallor    RESULTS  Recent Results (from the past 12 hour(s))   CBC WITH AUTOMATED DIFF    Collection Time: 08/03/21  3:31 PM   Result Value Ref Range    WBC 7.8 3.6 - 11.0 K/uL    RBC 4.71 3.80 - 5.20 M/uL    HGB 14.9 11.5 - 16.0 g/dL    HCT 44.8 35.0 - 47.0 %    MCV 95.1 80.0 - 99.0 FL    MCH 31.6 26.0 - 34.0 PG    MCHC 33.3 30.0 - 36.5 g/dL    RDW 12.9 11.5 - 14.5 %    PLATELET 314 863 - 222 K/uL    MPV 11.5 8.9 - 12.9 FL    NRBC 0.0 0  WBC    ABSOLUTE NRBC 0.00 0.00 - 0.01 K/uL    NEUTROPHILS 58 32 - 75 %    LYMPHOCYTES 30 12 - 49 %    MONOCYTES 8 5 - 13 %    EOSINOPHILS 3 0 - 7 %    BASOPHILS 1 0 - 1 %    IMMATURE GRANULOCYTES 0 0.0 - 0.5 %    ABS. NEUTROPHILS 4.5 1.8 - 8.0 K/UL    ABS. LYMPHOCYTES 2.3 0.8 - 3.5 K/UL    ABS. MONOCYTES 0.6 0.0 - 1.0 K/UL    ABS. EOSINOPHILS 0.3 0.0 - 0.4 K/UL    ABS. BASOPHILS 0.1 0.0 - 0.1 K/UL    ABS. IMM. GRANS. 0.0 0.00 - 0.04 K/UL    DF AUTOMATED     METABOLIC PANEL, COMPREHENSIVE    Collection Time: 08/03/21  3:31 PM   Result Value Ref Range    Sodium 139 136 - 145 mmol/L    Potassium 3.9 3.5 - 5.1 mmol/L    Chloride 108 97 - 108 mmol/L    CO2 25 21 - 32 mmol/L    Anion gap 6 5 - 15 mmol/L    Glucose 89 65 - 100 mg/dL    BUN 4 (L) 6 - 20 MG/DL    Creatinine 0.66 0.55 - 1.02 MG/DL    BUN/Creatinine ratio 6 (L) 12 - 20      GFR est AA >60 >60 ml/min/1.73m2    GFR est non-AA >60 >60 ml/min/1.73m2    Calcium 8.6 8.5 - 10.1 MG/DL    Bilirubin, total 0.5 0.2 - 1.0 MG/DL    ALT (SGPT) 22 12 - 78 U/L    AST (SGOT) 12 (L) 15 - 37 U/L    Alk.  phosphatase 96 45 - 117 U/L Protein, total 8.1 6.4 - 8.2 g/dL    Albumin 3.9 3.5 - 5.0 g/dL    Globulin 4.2 (H) 2.0 - 4.0 g/dL    A-G Ratio 0.9 (L) 1.1 - 2.2     LIPASE    Collection Time: 08/03/21  3:31 PM   Result Value Ref Range    Lipase 92 73 - 393 U/L   URINALYSIS W/MICROSCOPIC    Collection Time: 08/03/21  3:31 PM   Result Value Ref Range    Color YELLOW/STRAW      Appearance CLOUDY (A) CLEAR      Specific gravity 1.024 1.003 - 1.030      pH (UA) 6.5 5.0 - 8.0      Protein Negative NEG mg/dL    Glucose Negative NEG mg/dL    Ketone Negative NEG mg/dL    Bilirubin Negative NEG      Blood Negative NEG      Urobilinogen 0.2 0.2 - 1.0 EU/dL    Nitrites Negative NEG      Leukocyte Esterase MODERATE (A) NEG      WBC 10-20 0 - 4 /hpf    RBC 0-5 0 - 5 /hpf    Epithelial cells MANY (A) FEW /lpf    Bacteria Negative NEG /hpf   URINE CULTURE HOLD SAMPLE    Collection Time: 08/03/21  3:31 PM    Specimen: Serum; Urine   Result Value Ref Range    Urine culture hold        Urine on hold in Microbiology dept for 2 days. If unpreserved urine is submitted, it cannot be used for addtional testing after 24 hours, recollection will be required. SAMPLES BEING HELD    Collection Time: 08/03/21  3:31 PM   Result Value Ref Range    SAMPLES BEING HELD 1SST     COMMENT        Add-on orders for these samples will be processed based on acceptable specimen integrity and analyte stability, which may vary by analyte. HCG URINE, QL. - POC    Collection Time: 08/03/21  3:38 PM   Result Value Ref Range    Pregnancy test,urine (POC) Negative NEG     WET PREP    Collection Time: 08/03/21  6:49 PM    Specimen: Miscellaneous sample   Result Value Ref Range    Clue cells CLUE CELLS PRESENT      Wet prep NO TRICHOMONAS SEEN     KOH, OTHER SOURCES    Collection Time: 08/03/21  6:49 PM    Specimen: Vaginal Specimen;  Other   Result Value Ref Range    Special Requests: NO SPECIAL REQUESTS      KOH NO YEAST SEEN          IMAGING  CT ABD PELV W CONT    Result Date: 8/3/2021  1. Enlarged multicystic appearing ovaries, better characterized on prior pelvic ultrasound, again correlate for polycystic ovarian syndrome. 2.  Normal appendix. 3.  Fluid attenuation lesion within versus along the capsule of the posterior right hepatic lobe, not present on 4/18/2017 CT. This may represent a hepatic cyst versus an otherwise unspecified cystic lesion. Dedicated nonemergent liver mass protocol MRI recommended on an outpatient basis for further characterization. US TRANSVAGINAL    Result Date: 8/3/2021  1. There is an IUD in the endometrial canal. 2. There are numerous follicles in both ovaries, recommend clinical correlation for polycystic ovarian syndrome. US PELV NON OBS    Result Date: 8/3/2021  1. There is an IUD in the endometrial canal. 2. There are numerous follicles in both ovaries, recommend clinical correlation for polycystic ovarian syndrome. Procedures - none unless documented below    ED course: Labs and imaging reviewed. Upon my review of her records she had a positive culture for for chlamydia on 5/22/2019. She is sexually active with one partner. There was copious mucopurulent discharge on exam concerning for pelvic inflammatory disease given with her history and other exam exam findings. Ordered IM Rocephin, doxycycline and Flagyl administered here. However in the setting of persistent refractory pelvic pain despite IV Toradol and repeated doses of IV Dilaudid, I discussed her case with the [facility] hospitalist Loren Zelaya) who will admit her to his service for pain control and treatment of PID with IV antimicrobials.     Impression: Pelvic pain, bacterial vaginosis  Disposition: Admitted

## 2021-08-03 NOTE — DISCHARGE INSTRUCTIONS
- Your ultrasound showed multiple follicles in both ovaries, suggestive of possible polycystic ovarian syndrome (PCOS)  - Continue the prescribed antibiotics for bacterial vaginosis and possible pelvic infection until your tests for gonorrhea and chlamydia return  - No intercourse until cleared by OB/GYN  - Follow up with your OB/GYN to be reevaluated as soon as possible  - Return for fevers, severe pain, worsening bleeding, or any new or worsening symptoms

## 2021-08-04 LAB
BACTERIA SPEC CULT: NORMAL
CRP SERPL-MCNC: <0.29 MG/DL (ref 0–0.6)
SERVICE CMNT-IMP: NORMAL

## 2021-08-04 PROCEDURE — 65270000029 HC RM PRIVATE

## 2021-08-04 PROCEDURE — 74011250636 HC RX REV CODE- 250/636: Performed by: OBSTETRICS & GYNECOLOGY

## 2021-08-04 PROCEDURE — 36415 COLL VENOUS BLD VENIPUNCTURE: CPT

## 2021-08-04 PROCEDURE — 74011250637 HC RX REV CODE- 250/637: Performed by: OBSTETRICS & GYNECOLOGY

## 2021-08-04 PROCEDURE — 74011000250 HC RX REV CODE- 250: Performed by: OBSTETRICS & GYNECOLOGY

## 2021-08-04 PROCEDURE — 74011250637 HC RX REV CODE- 250/637: Performed by: STUDENT IN AN ORGANIZED HEALTH CARE EDUCATION/TRAINING PROGRAM

## 2021-08-04 PROCEDURE — 86140 C-REACTIVE PROTEIN: CPT

## 2021-08-04 RX ORDER — IBUPROFEN 600 MG/1
600 TABLET ORAL
COMMUNITY

## 2021-08-04 RX ORDER — HYDROMORPHONE HYDROCHLORIDE 1 MG/ML
1 INJECTION, SOLUTION INTRAMUSCULAR; INTRAVENOUS; SUBCUTANEOUS
Status: DISCONTINUED | OUTPATIENT
Start: 2021-08-04 | End: 2021-08-06 | Stop reason: HOSPADM

## 2021-08-04 RX ADMIN — WATER 1 G: 1 INJECTION INTRAMUSCULAR; INTRAVENOUS; SUBCUTANEOUS at 20:36

## 2021-08-04 RX ADMIN — OXYCODONE 10 MG: 5 TABLET ORAL at 14:38

## 2021-08-04 RX ADMIN — WATER 1 G: 1 INJECTION INTRAMUSCULAR; INTRAVENOUS; SUBCUTANEOUS at 09:12

## 2021-08-04 RX ADMIN — KETOROLAC TROMETHAMINE 30 MG: 30 INJECTION, SOLUTION INTRAMUSCULAR; INTRAVENOUS at 00:37

## 2021-08-04 RX ADMIN — DOXYCYCLINE HYCLATE 100 MG: 100 TABLET, COATED ORAL at 17:42

## 2021-08-04 RX ADMIN — WATER 500 MG: 1 INJECTION INTRAMUSCULAR; INTRAVENOUS; SUBCUTANEOUS at 00:37

## 2021-08-04 RX ADMIN — OXYCODONE 5 MG: 5 TABLET ORAL at 03:17

## 2021-08-04 RX ADMIN — METRONIDAZOLE 500 MG: 250 TABLET ORAL at 08:51

## 2021-08-04 RX ADMIN — KETOROLAC TROMETHAMINE 30 MG: 30 INJECTION, SOLUTION INTRAMUSCULAR; INTRAVENOUS at 12:46

## 2021-08-04 RX ADMIN — HYDROMORPHONE HYDROCHLORIDE 1 MG: 1 INJECTION, SOLUTION INTRAMUSCULAR; INTRAVENOUS; SUBCUTANEOUS at 20:35

## 2021-08-04 RX ADMIN — METRONIDAZOLE 500 MG: 250 TABLET ORAL at 17:41

## 2021-08-04 RX ADMIN — HYDROMORPHONE HYDROCHLORIDE 1 MG: 1 INJECTION, SOLUTION INTRAMUSCULAR; INTRAVENOUS; SUBCUTANEOUS at 17:42

## 2021-08-04 RX ADMIN — SODIUM CHLORIDE, POTASSIUM CHLORIDE, SODIUM LACTATE AND CALCIUM CHLORIDE 125 ML/HR: 600; 310; 30; 20 INJECTION, SOLUTION INTRAVENOUS at 01:59

## 2021-08-04 RX ADMIN — KETOROLAC TROMETHAMINE 30 MG: 30 INJECTION, SOLUTION INTRAMUSCULAR; INTRAVENOUS at 17:41

## 2021-08-04 RX ADMIN — ONDANSETRON 4 MG: 2 INJECTION INTRAMUSCULAR; INTRAVENOUS at 18:31

## 2021-08-04 RX ADMIN — OXYCODONE 10 MG: 5 TABLET ORAL at 22:28

## 2021-08-04 RX ADMIN — OXYCODONE 10 MG: 5 TABLET ORAL at 09:02

## 2021-08-04 RX ADMIN — KETOROLAC TROMETHAMINE 30 MG: 30 INJECTION, SOLUTION INTRAMUSCULAR; INTRAVENOUS at 06:25

## 2021-08-04 RX ADMIN — HYDROMORPHONE HYDROCHLORIDE 1 MG: 1 INJECTION, SOLUTION INTRAMUSCULAR; INTRAVENOUS; SUBCUTANEOUS at 14:45

## 2021-08-04 RX ADMIN — DOXYCYCLINE HYCLATE 100 MG: 100 TABLET, COATED ORAL at 08:51

## 2021-08-04 NOTE — ASSESSMENT & PLAN NOTE
She is in too much russel for outpatient therapy.   Will empirically treat with ceftriaxone 1 gram iv q12 h, doxycycline 100 mg po q12h, metronidazole 500 mg po q12h  Check CRP

## 2021-08-04 NOTE — ED NOTES
TRANSFER - OUT REPORT:    Verbal report given to sandra(name) on Rufino Garza  being transferred to 428(unit) for routine progression of care       Report consisted of patients Situation, Background, Assessment and   Recommendations(SBAR). Information from the following report(s) SBAR, ED Summary, [facility] and Recent Results was reviewed with the receiving nurse. Lines:   Peripheral IV Right Antecubital (Active)        Opportunity for questions and clarification was provided.       Patient transported with:   Registered Nurse

## 2021-08-04 NOTE — PROGRESS NOTES
8/4/2021 12:11 PM   Reason for Admission: Emergency -       ICD-10-CM ICD-9-CM    1. Pelvic pain  R10.2 [id number]    2. BV (bacterial vaginosis)  N76.0 616.10     B96.89 041. 9        Assessment:   [x]In person with pt   Charted address and phone numbers confirmed. RUR: 5%  Risk Level: [x]Low []Moderate []High  Value-based purchasing: [] Yes [x] No  Bundle patient: [] Yes [x] No   Specify:     Advance Directive: No Order. [x] No AD on file. Pt declined, confirmed primary decision makers. [] AD on file. [] Current AD not on file. Copy requested. [] Requests AD, and referral submitted to [facility]. Healthcare Decision Maker:   Primary Decision Maker: Juliana Carr - [phone]    Secondary Decision Maker: Edvin Cotto - [facility] - [phone]        Assessment:    Age: 27    Sex: [] Male [x]Female     Residency: [x]Private residence     Lives With: []With spouse []Other family members [x]Underage children []Alone []Care provider []Other:    Prior functioning:  [x]Independent with ADLs and iADLS []Dependent with ADLs and iADLs []Partial dependence, Specify:     Prior DME required:  [x]None []RW []Cane []Crutches []Bedside commode []CPAP []Home O2 (Liter/Provider: ) []Nebulizer   []Shower Chair []Wheelchair []Hospital Bed []Ines []Stair lift []Rollator []Other:    DME available: [x]None []RW []Cane []Crutches []Bedside commode []CPAP []Home O2 (Liter/Provider: ) []Nebulizer   []Shower Chair []Wheelchair []Hospital Bed []Ines []Stair lift []Rollator []Other:    Rehab history: [x]None []Outpatient PT []Home Health (Provider/Date: ) []SNF (Provider/Date: ) []IPR (Provider/Date: ) []LTC (Provider/Date: ) []Hospice (Provider/Date: )  []Other:       Comments: Pt is uninsured, provided Care Card application to pt. Pt to be screened for Medicaid by [employer]     Insurer:   Insurance Information          No coverages.           PCP: Aniket Francisco   Address: [address]   Phone number: [phone]   Current patient: [x]Yes []No   Approximate date of last visit: April 2021   Access to virtual PCP visits: [x]Yes []No    Pharmacy: [facility] [facility] Transport: Pt's mom or a friend        Transition of care plan:    [x] Home with outpatient follow-up  CM will follow for final discharge needs. SNOW Mcgarry    Care Management Interventions  PCP Verified by CM: Yes (Quinton Whittaker Signup: No  Discharge Durable Medical Equipment: No  Physical Therapy Consult: No  Occupational Therapy Consult: No  Speech Therapy Consult: No  Current Support Network:  Other  Discharge Location  Discharge Placement: Home

## 2021-08-04 NOTE — H&P
Gynecology History and Physical    Name: Lisa Mckeon MRN: [id number] SSN: [id number]    YOB: 1995  Age: 27 y.o. Sex: female       Subjective:      Chief complaint: LLQ Mouna Farrar is a 27 y.o. G3 [phone] LMP 21, presents complaining of LLQ pain. It started earlier today, and has gotten worse. It started in the LLQ, and has now spread to involve the RLQ and pelvis. She's had pain off and on for years, but never this bad. She was diaphoretic earlier today, but no chills or fevers. She denies vaginal discharge. She's had nausea, but no vomiting. The pain really stared with cramping at her LMP, 21. She bled for four days heavily, which is unusual since she received her [place] IUD. She stopped bleeding, and after a day or two it started again. She has been bleeding off and on for weeks. She denies any dyspareunia, pain with bowel movements or dysuria. She is in a stable relationship, with only one partner, though she did have Chlamydia in 2019. Past Medical History:   Diagnosis Date    Chlamydia     Migraines     Pelvic inflammatory disease (PID)      History reviewed. No pertinent surgical history. OB History        3    Para   2    Term   2       0    AB   0    Living   2       SAB   0    TAB   0    Ectopic   0    Molar   0    Multiple   0    Live Births   2              Allergies   Allergen Reactions    Chicken Flavor Itching    Other Medication Hives     Ham     Prior to Admission medications    Medication Sig Start Date End Date Taking? Authorizing Provider   albuterol (PROVENTIL HFA, VENTOLIN HFA, PROAIR HFA) 90 mcg/actuation inhaler TAKE 1 TO 2 PUFFS BY MOUTH EVERY 4 TO 6 HOURS AS NEEDED FOR DYSPNEA/WHEEZING 10/30/19   Provider, Historical   levonorgestrel (KYLEENA) 17.5 mcg/24 hr (5 years) IUD IUD 1 Each by IntraUTERine route once.     Provider, Historical      Family History   Problem Relation Age of Onset    Ovarian Cancer Mother    [facility] Hypertension Maternal Grandmother     Diabetes Maternal Grandmother      Social History     Socioeconomic History    Marital status: SINGLE     Spouse name: Not on file    Number of children: Not on file    Years of education: Not on file    Highest education level: Not on file   Occupational History    Not on file   Tobacco Use    Smoking status: Never Smoker    Smokeless tobacco: Never Used   Substance and Sexual Activity    Alcohol use: Yes     Alcohol/week: 3.0 - 4.0 standard drinks     Types: 3 - 4 Shots of liquor per week    Drug use: No    Sexual activity: Yes     Partners: Male     Birth control/protection: I.U.D. Other Topics Concern    Not on file   Social History Narrative    Not on file     Social Determinants of Health     Financial Resource Strain:     Difficulty of Paying Living Expenses:    Food Insecurity:     Worried About Running Out of Food in the Last Year:     [address] in the Last Year:    Transportation Needs:     Lack of Transportation (Medical):  Lack of Transportation (Non-Medical):    Physical Activity:     Days of Exercise per Week:     Minutes of Exercise per Session:    Stress:     Feeling of Stress :    Social Connections:     Frequency of Communication with Friends and Family:     Frequency of Social Gatherings with Friends and Family:     Attends Anglican Services:     Active Member of Clubs or Organizations:     Attends Club or Organization Meetings:     Marital Status:    Intimate Partner Violence:     Fear of Current or Ex-Partner:     Emotionally Abused:     Physically Abused:     Sexually Abused:        Review of Systems   Constitutional: Positive for diaphoresis. Negative for activity change, appetite change, chills, fatigue and fever. HENT: Negative. Eyes: Negative. Respiratory: Negative. Negative for wheezing. Cardiovascular: Negative. Gastrointestinal: Positive for abdominal pain and nausea.  Negative for blood in stool, constipation, diarrhea and vomiting. Endocrine: Negative. Genitourinary: Positive for menstrual problem, pelvic pain and vaginal bleeding. Negative for difficulty urinating, dyspareunia, dysuria, flank pain, genital sores and vaginal discharge. Musculoskeletal: Negative. Allergic/Immunologic: Negative. Neurological: Negative. Negative for dizziness, seizures, syncope, numbness and headaches. Hematological: Negative. Psychiatric/Behavioral: Negative. Objective:     Vitals:    08/03/21 1444 08/03/21 1658   BP: 119/74    Pulse: 69    Resp: 16    Temp: 97.5 °F (36.4 °C)    SpO2: 100% 100%   Weight: 95.3 kg (210 lb)    Height: 5' 5\" (1.651 m)        Physical Exam  Exam conducted with a chaperone present. Constitutional:       General: She is not in acute distress. Comments: /74 (BP 1 Location: Right arm, BP Patient Position: At rest)   Pulse 69   Temp 97.5 °F (36.4 °C)   Resp 16   Ht 5' 5\" (1.651 m)   Wt 95.3 kg (210 lb)   SpO2 100%   BMI 34.95 kg/m²      HENT:      Head: Normocephalic and atraumatic. Eyes:      Extraocular Movements: Extraocular movements intact. Pupils: Pupils are equal, round, and reactive to light. Cardiovascular:      Rate and Rhythm: Normal rate and regular rhythm. Heart sounds: No murmur heard. Pulmonary:      Breath sounds: Normal breath sounds. No wheezing, rhonchi or rales. Abdominal:      General: Abdomen is flat. Bowel sounds are normal. There is no distension. Palpations: Abdomen is soft. There is no mass. Tenderness: There is abdominal tenderness. There is no right CVA tenderness, left CVA tenderness, guarding or rebound. Genitourinary:     General: Normal vulva. Comments: Pelvic exam reveal bilateral adnexal tenderness. There is some cervical motion tenderness, but not exquisite  Uterus normal size, shape, and contour, not tender.   No adnexal masses  Musculoskeletal:         General: No swelling or tenderness. Normal range of motion. Cervical back: Normal range of motion and neck supple. Skin:     General: Skin is warm and dry. Neurological:      General: No focal deficit present. Mental Status: She is alert and oriented to person, place, and time. Mental status is at baseline. Cranial Nerves: No cranial nerve deficit. Sensory: No sensory deficit. Motor: No weakness. Psychiatric:         Mood and Affect: Mood normal.         Behavior: Behavior normal.         Assessment/Plan:     Active Hospital Problems    Diagnosis Date Noted    Acute female pelvic inflammatory disease 08/03/2021     Priority: 1 - One    Abdominal pain, right lower quadrant 08/03/2021     Priority: 2 - Two    Abdominal pain, left lower quadrant 08/03/2021     Priority: 2 - Two    Irregular intermenstrual bleeding 08/03/2021     Priority: 3 - Three    Bacterial vaginosis 08/03/2021     Priority: 4 - Four     Acute female pelvic inflammatory disease  She is in too much russel for outpatient therapy.   Will empirically treat with ceftriaxone 1 gram iv q12 h, doxycycline 100 mg po q12h, metronidazole 500 mg po q12h  Check CRP

## 2021-08-04 NOTE — PROGRESS NOTES
Problem: Falls - Risk of  Goal: *Absence of Falls  Description: Document Bill Rodriguez Fall Risk and appropriate interventions in the flowsheet.   Outcome: Progressing Towards Goal  Note: Fall Risk Interventions:  Mobility Interventions: Patient to call before getting OOB         Medication Interventions: Patient to call before getting OOB                   Problem: Patient Education: Go to Patient Education Activity  Goal: Patient/Family Education  Outcome: Progressing Towards Goal

## 2021-08-04 NOTE — PROGRESS NOTES
Provided pastoral care visit to [facility] patient. Did not include sacramental care.     Jerrod Umanzor

## 2021-08-04 NOTE — PROGRESS NOTES
Sign out given to [facility], Dr. Zoya Hwang. [phone]. Pain improved slightly per nursing plan on redosing pain meds shortly.        Inna Handy MD

## 2021-08-05 PROCEDURE — 99232 SBSQ HOSP IP/OBS MODERATE 35: CPT | Performed by: OBSTETRICS & GYNECOLOGY

## 2021-08-05 PROCEDURE — 74011250636 HC RX REV CODE- 250/636: Performed by: OBSTETRICS & GYNECOLOGY

## 2021-08-05 PROCEDURE — 65270000029 HC RM PRIVATE

## 2021-08-05 PROCEDURE — 74011250637 HC RX REV CODE- 250/637: Performed by: OBSTETRICS & GYNECOLOGY

## 2021-08-05 PROCEDURE — 74011250637 HC RX REV CODE- 250/637: Performed by: STUDENT IN AN ORGANIZED HEALTH CARE EDUCATION/TRAINING PROGRAM

## 2021-08-05 PROCEDURE — 74011000250 HC RX REV CODE- 250: Performed by: OBSTETRICS & GYNECOLOGY

## 2021-08-05 RX ADMIN — SODIUM CHLORIDE, POTASSIUM CHLORIDE, SODIUM LACTATE AND CALCIUM CHLORIDE 125 ML/HR: 600; 310; 30; 20 INJECTION, SOLUTION INTRAVENOUS at 00:09

## 2021-08-05 RX ADMIN — HYDROMORPHONE HYDROCHLORIDE 1 MG: 1 INJECTION, SOLUTION INTRAMUSCULAR; INTRAVENOUS; SUBCUTANEOUS at 00:09

## 2021-08-05 RX ADMIN — DOXYCYCLINE HYCLATE 100 MG: 100 TABLET, COATED ORAL at 08:08

## 2021-08-05 RX ADMIN — HYDROMORPHONE HYDROCHLORIDE 1 MG: 1 INJECTION, SOLUTION INTRAMUSCULAR; INTRAVENOUS; SUBCUTANEOUS at 17:34

## 2021-08-05 RX ADMIN — WATER 1 G: 1 INJECTION INTRAMUSCULAR; INTRAVENOUS; SUBCUTANEOUS at 20:40

## 2021-08-05 RX ADMIN — ONDANSETRON 4 MG: 2 INJECTION INTRAMUSCULAR; INTRAVENOUS at 08:28

## 2021-08-05 RX ADMIN — KETOROLAC TROMETHAMINE 30 MG: 30 INJECTION, SOLUTION INTRAMUSCULAR; INTRAVENOUS at 23:53

## 2021-08-05 RX ADMIN — PROCHLORPERAZINE EDISYLATE 5 MG: 5 INJECTION INTRAMUSCULAR; INTRAVENOUS at 11:01

## 2021-08-05 RX ADMIN — OXYCODONE 10 MG: 5 TABLET ORAL at 18:45

## 2021-08-05 RX ADMIN — SODIUM CHLORIDE, POTASSIUM CHLORIDE, SODIUM LACTATE AND CALCIUM CHLORIDE 125 ML/HR: 600; 310; 30; 20 INJECTION, SOLUTION INTRAVENOUS at 11:04

## 2021-08-05 RX ADMIN — OXYCODONE 10 MG: 5 TABLET ORAL at 02:06

## 2021-08-05 RX ADMIN — DOXYCYCLINE HYCLATE 100 MG: 100 TABLET, COATED ORAL at 17:35

## 2021-08-05 RX ADMIN — WATER 1 G: 1 INJECTION INTRAMUSCULAR; INTRAVENOUS; SUBCUTANEOUS at 11:00

## 2021-08-05 RX ADMIN — METRONIDAZOLE 500 MG: 250 TABLET ORAL at 17:35

## 2021-08-05 RX ADMIN — KETOROLAC TROMETHAMINE 30 MG: 30 INJECTION, SOLUTION INTRAMUSCULAR; INTRAVENOUS at 11:05

## 2021-08-05 RX ADMIN — KETOROLAC TROMETHAMINE 30 MG: 30 INJECTION, SOLUTION INTRAMUSCULAR; INTRAVENOUS at 00:09

## 2021-08-05 RX ADMIN — KETOROLAC TROMETHAMINE 30 MG: 30 INJECTION, SOLUTION INTRAMUSCULAR; INTRAVENOUS at 17:36

## 2021-08-05 RX ADMIN — HYDROMORPHONE HYDROCHLORIDE 1 MG: 1 INJECTION, SOLUTION INTRAMUSCULAR; INTRAVENOUS; SUBCUTANEOUS at 08:03

## 2021-08-05 RX ADMIN — SODIUM CHLORIDE, POTASSIUM CHLORIDE, SODIUM LACTATE AND CALCIUM CHLORIDE 125 ML/HR: 600; 310; 30; 20 INJECTION, SOLUTION INTRAVENOUS at 20:31

## 2021-08-05 RX ADMIN — KETOROLAC TROMETHAMINE 30 MG: 30 INJECTION, SOLUTION INTRAMUSCULAR; INTRAVENOUS at 06:19

## 2021-08-05 RX ADMIN — ONDANSETRON 4 MG: 2 INJECTION INTRAMUSCULAR; INTRAVENOUS at 17:34

## 2021-08-05 RX ADMIN — OXYCODONE 10 MG: 5 TABLET ORAL at 06:19

## 2021-08-05 RX ADMIN — ONDANSETRON 4 MG: 2 INJECTION INTRAMUSCULAR; INTRAVENOUS at 00:09

## 2021-08-05 RX ADMIN — HYDROMORPHONE HYDROCHLORIDE 1 MG: 1 INJECTION, SOLUTION INTRAMUSCULAR; INTRAVENOUS; SUBCUTANEOUS at 20:40

## 2021-08-05 RX ADMIN — HYDROMORPHONE HYDROCHLORIDE 1 MG: 1 INJECTION, SOLUTION INTRAMUSCULAR; INTRAVENOUS; SUBCUTANEOUS at 04:04

## 2021-08-05 RX ADMIN — METRONIDAZOLE 500 MG: 250 TABLET ORAL at 08:08

## 2021-08-05 NOTE — PROGRESS NOTES
Spiritual Care Assessment/Progress Note  [place] [facility]      NAME: Blanca Roland      MRN: [id number]  AGE: 27 y.o. SEX: female  Hinduism Affiliation: Roman Catholic   Language: English     8/5/2021     Total Time (in minutes): 5     Spiritual Assessment begun in [facility] POST SURG ORT 2 through conversation with:         [x]Patient        [] Family    [] Friend(s)        Reason for Consult: [facility]     Spiritual beliefs: (Please include comment if needed)     [x] Identifies with a john tradition:  Roman Catholic       [] Supported by a john community:            [] Claims no spiritual orientation:           [] Seeking spiritual identity:                [] Adheres to an individual form of spirituality:           [] Not able to assess:                           Identified resources for coping:      [x] Prayer                               [x] Music                  [] Guided Imagery     [x] Family/friends                 [] Pet visits     [] Devotional reading                         [] Unknown     [] Other:                                              Interventions offered during this visit: (See comments for more details)    Patient Interventions: Affirmation of john, Communion (Roman Catholic), Initial/Spiritual assessment, patient floor, Prayer (actual), Prayer (assurance of)           Plan of Care:     [x] Support spiritual and/or cultural needs    [] Support AMD and/or advance care planning process      [] Support grieving process   [] Coordinate Rites and/or Rituals    [] Coordination with community clergy   [] No spiritual needs identified at this time   [] Detailed Plan of Care below (See Comments)  [] Make referral to Music Therapy  [] Make referral to Pet Therapy     [] Make referral to Addiction services  [] Make referral to [facility]  [] Make referral to Spiritual Care Partner  [] No future visits requested        [] Follow up upon further referrals     Comments: Ms. Amol Gallo was in bed. She declined communion today and thanked me for coming.       AGUS Hernandes, RN, ACSW, LCSW   Page:  [phone]

## 2021-08-05 NOTE — CONSULTS
[address] [place] [address] with [facility] and [address] [place]    Office [phone], Fax [phone]    Family Medicine Consult    Patient:  Valerio Steele  MRN:  [id number]  YOB: 1995  Age:  27 y.o. Primary care provider:  Leticia Mills MD    Date of admission:  8/3/2021    Date of consultation:  2021    Requesting physician:   Mary Salcedo MD                   History of present illness  Valerio Steele is a 27 y.o. female  was admitted on  for lower abdominal pain. Family medicine was consulted in regards to abdominal/pelvic pain. According to the patient, her lower abdominal pain started on . The pain is a constant sharp pain. Starts from the LLQ, move to he RLQ and then to the lower back. Rates it 6/10 currently and 8/10 at it's worse. She took Ibuprofen 600 mg and that lowered the pain, but the pain was still present. Last sexual intercourse was 1 month ago. Per patient, no history of STI's or STD's. IUD was placed 3 years ago. Patient endorses vaginal bleeding that started 4 weeks ago. She has been having vaginal bleeding without clots every week since then. The vaginal bleeding last for 3 days and subside. Her last vaginal bleeding was on Thursday (). Endorses vomiting, she had 3 episode of non-bilious vomiting. Negative for diarrhea, constipation, dysuria, hematuria, hematochezia. No hx of STI's or STD's. Past Medical History:   Diagnosis Date    Chlamydia     Migraines     Pelvic inflammatory disease (PID)       History reviewed. No pertinent surgical history.   Family History   Problem Relation Age of Onset    Ovarian Cancer Mother     Hypertension Maternal Grandmother     Diabetes Maternal Grandmother       Social History     Tobacco Use    Smoking status: Never Smoker    Smokeless tobacco: Never Used   Substance Use Topics    Alcohol use: Yes     Alcohol/week: 3.0 - 4.0 standard drinks     Types: 3 - 4 Shots of liquor per week       Prior to Admission Medications   Prescriptions Last Dose Informant Patient Reported? Taking? albuterol (PROVENTIL HFA, VENTOLIN HFA, PROAIR HFA) 90 mcg/actuation inhaler 2021  Yes No   Sig: TAKE 1 TO 2 PUFFS BY MOUTH EVERY 4 TO 6 HOURS AS NEEDED FOR DYSPNEA/WHEEZING   ibuprofen (MOTRIN) 600 mg tablet 2021  Yes Yes   Sig: Take 600 mg by mouth every six (6) hours as needed for Pain (Headaches). levonorgestrel (KYLEENA) 17.5 mcg/24 hr (5 years) IUD IUD 2018  Yes No   Si Each by IntraUTERine route once.       Facility-Administered Medications: None       Current Facility-Administered Medications   Medication Dose Route Frequency Provider Last Rate Last Admin    prochlorperazine (COMPAZINE) with saline injection 5 mg  5 mg IntraVENous Q4H PRN Karli Early MD   5 mg at 21 1101    HYDROmorphone (DILAUDID) injection 1 mg  1 mg IntraVENous Q3H PRN Karli Early MD   1 mg at 21 0803    doxycycline (VIBRA-TABS) tablet 100 mg  100 mg Oral BID WITH MEALS Shadia Aden MD   100 mg at 21 3543    lactated Ringers infusion  125 mL/hr IntraVENous CONTINUOUS Evon Radford  mL/hr at 21 1104 125 mL/hr at 21 1104    metroNIDAZOLE (FLAGYL) tablet 500 mg  500 mg Oral BID WITH MEALS Evon Radford MD   500 mg at 21 0808    ondansetron (ZOFRAN) injection 4 mg  4 mg IntraVENous Q6H PRN Evon Radford MD   4 mg at 21 0226    acetaminophen (TYLENOL) tablet 650 mg  650 mg Oral Q4H PRN Evno Radford MD        ketorolac (TORADOL) injection 30 mg  30 mg IntraVENous Q6H Evon Radford MD   30 mg at 21 1105    oxyCODONE IR (ROXICODONE) tablet 5 mg  5 mg Oral Q4H PRN Evon Radford MD   5 mg at 21 0317    oxyCODONE IR (ROXICODONE) tablet 10 mg  10 mg Oral Q4H PRN Evon Radford MD 10 mg at 08/05/21 7213    albuterol (PROVENTIL VENTOLIN) nebulizer solution 2.5 mg  2.5 mg Nebulization Q4H PRN Isaac Moss MD        cefTRIAXone (ROCEPHIN) 1 g in sterile water (preservative free) 10 mL IV syringe  1 g IntraVENous Q12H Isaac Moss  mL/hr at 08/05/21 1100 1 g at 08/05/21 1100        Allergies   Allergen Reactions    Chicken Flavor Itching    Other Medication Hives     Ham           There is no immunization history on file for this patient. Review of systems  A comprehensive review of systems was negative except for that written in the History of Present Illness. The remainder of the review of systems was reviewed and is non-contributory. Physical Examination   Visit Vitals  /79 (BP 1 Location: Left upper arm, BP Patient Position: At rest)   Pulse 73   Temp 97.6 °F (36.4 °C)   Resp 17   Ht 5' 5\" (1.651 m)   Wt 210 lb (95.3 kg)   SpO2 98%   BMI 34.95 kg/m²       Intake and Output:    08/05 0701 - 08/05 1900  In: 660 [P.O.:650; I.V.:10]  Out: -   08/03 1901 - 08/05 0700  In: 6775 [P.O.:720; I.V.:1000]  Out: -     Visit Vitals  /79 (BP 1 Location: Left upper arm, BP Patient Position: At rest)   Pulse 73   Temp 97.6 °F (36.4 °C)   Resp 17   Ht 5' 5\" (1.651 m)   Wt 210 lb (95.3 kg)   SpO2 98%   BMI 34.95 kg/m²       General: No acute distress. Alert. Cooperative. Head: Normocephalic. Atraumatic  Respiratory: CTAB. No w/r/r/c.  Cardiovascular: RRR. Normal S1,S2. No m/r/g.  GI: + bowel sounds. Tenderness in lower abdominal region. No rebound tenderness or guarding. Neuro: No focal deficit   Ext: No LE edema      Data Review:   No results found for this or any previous visit (from the past 24 hour(s)). 24 Hour Results:  No results found for this or any previous visit (from the past 24 hour(s)).   Recent Labs     08/03/21  1531   WBC 7.8   HGB 14.9   HCT 44.8        Recent Labs     08/03/21  1531      K 3.9      CO2 25   GLU 89   BUN 4*   CREA 0.66   CA 8.6   ALB 3.9   ALT 22         Impression/Recomendations   Cecille Overall is a 27 y.o. female with h/o migraines, h/o chlamydia, obesity. The [facility] team was consulted for further evaluation of lower abdominal pain. Lower abd pain: DDx includes infectious process, multicystic ovaries, kidney stone. Less likely infectious given CT A/P is negative for acute process and no leukocytosis. POA UPT neg. Transvaginal ultrasound also c/w numerous follicles in both ovaries. Ucx: NG. CRP neg. - Repeat CT abd w/o Cont and UA tomorrow if pain continues (CT w/ cont can obscure kidney stones)   - Follow up Chlamydia and GC   - Follow repeat CBC    - Continue CTX, Doxy and Flagyl. BV: Clue cells on wet prep  - Continue Flagyl    Irregular menstrual bleeding: per Ob-gyn    Right hepatic cyst vs unspecified lesion: incidental on CT A/P   - F/u outpatient liver mass protocol MRI    Severe Obesity: Body mass index is 34.95 kg/m². - Continue counseling lifestyle modifications    Pain Management: Per Ob-gyn    DVT Prophylaxis: Per Ob-gyn    Disposition: Per Ob-gyn     Thank you very much for allowing us to participate in the care of this pleasant patient. The family medicine service will continue to follow the patient's medical progress along with you. Please do not hesitate to page us at [phone] with any questions or concerns.       Signed by:     Eugenie Alcocer MD   Family Medicine Resident    August 5, 2021 at 12:54 PM

## 2021-08-05 NOTE — PROGRESS NOTES
Gynecology Progress Note    Admission day 1. No new complaints. Pain pretty well controlled on current medication. Admission symptoms are better. Pt wants and expects her pain to be gone if she takes pain medication. Pain is primarily left but moves to the right at times. Vitals:  Blood pressure 120/82, pulse (!) 58, temperature 97.7 °F (36.5 °C), resp. rate 17, height 5' 5\" (1.651 m), weight 210 lb (95.3 kg), SpO2 99 %. Temp (24hrs), Av.9 °F (36.6 °C), Min:97.6 °F (36.4 °C), Max:98.2 °F (36.8 °C)      I and O: OK    Exam:  Patient without distress. Abdomen soft,  nontender. Lower extremities are negative for swelling, cords, or tenderness. Lab results: No results found for this or any previous visit (from the past 24 hour(s)). Labs ordered for  were not drawn. Assessment and Plan:   Patient stable. Discussed management of pain. Continue current care plan. Advised that dx of PID may not be accurate. Suggested she may have viral GI lymphadenitis. That may take some time to resolve. If pain is better managed tomorrow she may be able to go home. Addendum:  Pt does not really exhibit picture consistent with pelvic inflammatory disease. She has no rebound or guarding, no fever, discharge or US findings of fluid in the pelvis. Her WBC is low with shift to viral side. Has shown no pain improvement with 24 hours of antibiotics. I think she needs further evaluation by hospitalist to consider other sources of abdominal/pelvic pain.

## 2021-08-06 ENCOUNTER — TELEPHONE (OUTPATIENT)
Dept: FAMILY MEDICINE CLINIC | Age: 26
End: 2021-08-06

## 2021-08-06 VITALS
DIASTOLIC BLOOD PRESSURE: 65 MMHG | TEMPERATURE: 97.9 F | HEIGHT: 65 IN | HEART RATE: 60 BPM | SYSTOLIC BLOOD PRESSURE: 102 MMHG | WEIGHT: 210 LBS | BODY MASS INDEX: 34.99 KG/M2 | OXYGEN SATURATION: 98 % | RESPIRATION RATE: 16 BRPM

## 2021-08-06 LAB
APPEARANCE UR: ABNORMAL
BACTERIA URNS QL MICRO: NEGATIVE /HPF
BASOPHILS # BLD: 0 K/UL (ref 0–0.1)
BASOPHILS NFR BLD: 1 % (ref 0–1)
BILIRUB UR QL: NEGATIVE
C TRACH RRNA SPEC QL NAA+PROBE: NEGATIVE
COLOR UR: ABNORMAL
COMMENT, HOLDF: NORMAL
DIFFERENTIAL METHOD BLD: NORMAL
EOSINOPHIL # BLD: 0.3 K/UL (ref 0–0.4)
EOSINOPHIL NFR BLD: 4 % (ref 0–7)
EPITH CASTS URNS QL MICRO: ABNORMAL /LPF
ERYTHROCYTE [DISTWIDTH] IN BLOOD BY AUTOMATED COUNT: 12.5 % (ref 11.5–14.5)
GLUCOSE UR STRIP.AUTO-MCNC: NEGATIVE MG/DL
HCT VFR BLD AUTO: 41.1 % (ref 35–47)
HGB BLD-MCNC: 13.1 G/DL (ref 11.5–16)
HGB UR QL STRIP: NEGATIVE
HYALINE CASTS URNS QL MICRO: ABNORMAL /LPF (ref 0–5)
IMM GRANULOCYTES # BLD AUTO: 0 K/UL (ref 0–0.04)
IMM GRANULOCYTES NFR BLD AUTO: 0 % (ref 0–0.5)
KETONES UR QL STRIP.AUTO: NEGATIVE MG/DL
LEUKOCYTE ESTERASE UR QL STRIP.AUTO: ABNORMAL
LYMPHOCYTES # BLD: 2.3 K/UL (ref 0.8–3.5)
LYMPHOCYTES NFR BLD: 38 % (ref 12–49)
MCH RBC QN AUTO: 30.9 PG (ref 26–34)
MCHC RBC AUTO-ENTMCNC: 31.9 G/DL (ref 30–36.5)
MCV RBC AUTO: 96.9 FL (ref 80–99)
MONOCYTES # BLD: 0.5 K/UL (ref 0–1)
MONOCYTES NFR BLD: 9 % (ref 5–13)
N GONORRHOEA RRNA SPEC QL NAA+PROBE: NEGATIVE
NEUTS SEG # BLD: 2.9 K/UL (ref 1.8–8)
NEUTS SEG NFR BLD: 48 % (ref 32–75)
NITRITE UR QL STRIP.AUTO: NEGATIVE
NRBC # BLD: 0 K/UL (ref 0–0.01)
NRBC BLD-RTO: 0 PER 100 WBC
PH UR STRIP: 7.5 [PH] (ref 5–8)
PLATELET # BLD AUTO: 222 K/UL (ref 150–400)
PLEASE NOTE:, 188601: NORMAL
PMV BLD AUTO: 11.5 FL (ref 8.9–12.9)
PROT UR STRIP-MCNC: NEGATIVE MG/DL
RBC # BLD AUTO: 4.24 M/UL (ref 3.8–5.2)
RBC #/AREA URNS HPF: ABNORMAL /HPF (ref 0–5)
SAMPLES BEING HELD,HOLD: NORMAL
SP GR UR REFRACTOMETRY: 1.01 (ref 1–1.03)
SPECIMEN SOURCE: NORMAL
UROBILINOGEN UR QL STRIP.AUTO: 0.2 EU/DL (ref 0.2–1)
WBC # BLD AUTO: 6 K/UL (ref 3.6–11)
WBC URNS QL MICRO: ABNORMAL /HPF (ref 0–4)

## 2021-08-06 PROCEDURE — 99231 SBSQ HOSP IP/OBS SF/LOW 25: CPT | Performed by: FAMILY MEDICINE

## 2021-08-06 PROCEDURE — 74011250637 HC RX REV CODE- 250/637: Performed by: STUDENT IN AN ORGANIZED HEALTH CARE EDUCATION/TRAINING PROGRAM

## 2021-08-06 PROCEDURE — 36415 COLL VENOUS BLD VENIPUNCTURE: CPT

## 2021-08-06 PROCEDURE — 99238 HOSP IP/OBS DSCHRG MGMT 30/<: CPT | Performed by: OBSTETRICS & GYNECOLOGY

## 2021-08-06 PROCEDURE — 74011250636 HC RX REV CODE- 250/636: Performed by: OBSTETRICS & GYNECOLOGY

## 2021-08-06 PROCEDURE — 81001 URINALYSIS AUTO W/SCOPE: CPT

## 2021-08-06 PROCEDURE — 74011000250 HC RX REV CODE- 250: Performed by: OBSTETRICS & GYNECOLOGY

## 2021-08-06 PROCEDURE — 74011250637 HC RX REV CODE- 250/637: Performed by: OBSTETRICS & GYNECOLOGY

## 2021-08-06 PROCEDURE — 85025 COMPLETE CBC W/AUTO DIFF WBC: CPT

## 2021-08-06 RX ORDER — HYDROCODONE BITARTRATE AND ACETAMINOPHEN 7.5; 325 MG/1; MG/1
1 TABLET ORAL
Qty: 24 TABLET | Refills: 0 | Status: SHIPPED | OUTPATIENT
Start: 2021-08-06 | End: 2021-08-13

## 2021-08-06 RX ORDER — METRONIDAZOLE 500 MG/1
500 TABLET ORAL 2 TIMES DAILY WITH MEALS
Qty: 10 TABLET | Refills: 0 | Status: SHIPPED | OUTPATIENT
Start: 2021-08-06

## 2021-08-06 RX ORDER — DOXYCYCLINE HYCLATE 100 MG
100 TABLET ORAL 2 TIMES DAILY WITH MEALS
Qty: 10 TABLET | Refills: 0 | Status: SHIPPED | OUTPATIENT
Start: 2021-08-06

## 2021-08-06 RX ADMIN — DOXYCYCLINE HYCLATE 100 MG: 100 TABLET, COATED ORAL at 07:47

## 2021-08-06 RX ADMIN — KETOROLAC TROMETHAMINE 30 MG: 30 INJECTION, SOLUTION INTRAMUSCULAR; INTRAVENOUS at 06:09

## 2021-08-06 RX ADMIN — OXYCODONE 5 MG: 5 TABLET ORAL at 04:11

## 2021-08-06 RX ADMIN — OXYCODONE 10 MG: 5 TABLET ORAL at 12:13

## 2021-08-06 RX ADMIN — OXYCODONE 10 MG: 5 TABLET ORAL at 07:47

## 2021-08-06 RX ADMIN — SODIUM CHLORIDE, POTASSIUM CHLORIDE, SODIUM LACTATE AND CALCIUM CHLORIDE 125 ML/HR: 600; 310; 30; 20 INJECTION, SOLUTION INTRAVENOUS at 04:12

## 2021-08-06 RX ADMIN — METRONIDAZOLE 500 MG: 250 TABLET ORAL at 07:47

## 2021-08-06 RX ADMIN — KETOROLAC TROMETHAMINE 30 MG: 30 INJECTION, SOLUTION INTRAMUSCULAR; INTRAVENOUS at 11:45

## 2021-08-06 RX ADMIN — WATER 1 G: 1 INJECTION INTRAMUSCULAR; INTRAVENOUS; SUBCUTANEOUS at 07:47

## 2021-08-06 NOTE — PROGRESS NOTES
8/6/2021  9:11 AM    RUR:  6%  Risk Level: [x]Low []Moderate []High  Value-based purchasing: [] Yes [x] No  Bundle patient: [] Yes [x] No   Specify:     Transition of care plan:  1. Medically stable with discharge order   2. Home with outpatient follow-up  3. Outpatient follow-up. 4. Pt's family to transport  5. No further needs identified    Care Management Interventions  PCP Verified by CM: Yes (Shamar Whittaker Signup: No  Discharge Durable Medical Equipment: No  Physical Therapy Consult: No  Occupational Therapy Consult: No  Speech Therapy Consult: No  Current Support Network:  Other  Discharge Location  Discharge Placement: Home with outpatient services    Esmer Palacios RN

## 2021-08-06 NOTE — DISCHARGE SUMMARY
Gynecology Discharge Summary     Name: Cain Hamilton MRN: [id number]  SSN: [id number]    YOB: 1995  Age: 27 y.o. Sex: female      Admit date: 8/3/2021    Discharge Date: 8/6/2021      Attending Physician: Esteban Monroy MD     Admission Diagnoses: Acute female pelvic inflammatory disease [N73.0]    Discharge Diagnoses: Acute female pelvic inflammatory disease [N73.0] Pelvic pain in female    Procedures: * No surgery found [facility] Course: Hospital course: Pt admitted for pain. It was well controlled but not eliminated. Pelvic inflammatory disease was the admission impression but the actual dx remains unknown. Without hematuria or colicky pain and not unilateral in location with normal CT, renal stone was essentially ruled out. Pt had no GI symptoms. US was essentially normal with some suggestion of PCOS, which is not by itself a cause for acute pelvic pain. The patient was released to her home in good condition. Significant Diagnostic Studies:   Recent Results (from the past 24 hour(s))   CBC WITH AUTOMATED DIFF    Collection Time: 08/06/21  4:00 AM   Result Value Ref Range    WBC 6.0 3.6 - 11.0 K/uL    RBC 4.24 3.80 - 5.20 M/uL    HGB 13.1 11.5 - 16.0 g/dL    HCT 41.1 35.0 - 47.0 %    MCV 96.9 80.0 - 99.0 FL    MCH 30.9 26.0 - 34.0 PG    MCHC 31.9 30.0 - 36.5 g/dL    RDW 12.5 11.5 - 14.5 %    PLATELET 454 594 - 100 K/uL    MPV 11.5 8.9 - 12.9 FL    NRBC 0.0 0  WBC    ABSOLUTE NRBC 0.00 0.00 - 0.01 K/uL    NEUTROPHILS 48 32 - 75 %    LYMPHOCYTES 38 12 - 49 %    MONOCYTES 9 5 - 13 %    EOSINOPHILS 4 0 - 7 %    BASOPHILS 1 0 - 1 %    IMMATURE GRANULOCYTES 0 0.0 - 0.5 %    ABS. NEUTROPHILS 2.9 1.8 - 8.0 K/UL    ABS. LYMPHOCYTES 2.3 0.8 - 3.5 K/UL    ABS. MONOCYTES 0.5 0.0 - 1.0 K/UL    ABS. EOSINOPHILS 0.3 0.0 - 0.4 K/UL    ABS. BASOPHILS 0.0 0.0 - 0.1 K/UL    ABS. IMM.  GRANS. 0.0 0.00 - 0.04 K/UL    DF AUTOMATED     URINALYSIS W/MICROSCOPIC    Collection Time: 08/06/21  8:23 AM Result Value Ref Range    Color YELLOW/STRAW      Appearance CLOUDY (A) CLEAR      Specific gravity 1.012 1.003 - 1.030      pH (UA) 7.5 5.0 - 8.0      Protein Negative NEG mg/dL    Glucose Negative NEG mg/dL    Ketone Negative NEG mg/dL    Bilirubin Negative NEG      Blood Negative NEG      Urobilinogen 0.2 0.2 - 1.0 EU/dL    Nitrites Negative NEG      Leukocyte Esterase SMALL (A) NEG      WBC 20-50 0 - 4 /hpf    RBC 0-5 0 - 5 /hpf    Epithelial cells MANY (A) FEW /lpf    Bacteria Negative NEG /hpf    Hyaline cast 2-5 0 - 5 /lpf   SAMPLES BEING HELD    Collection Time: 08/06/21  8:23 AM   Result Value Ref Range    SAMPLES BEING HELD 1UC     COMMENT        Add-on orders for these samples will be processed based on acceptable specimen integrity and analyte stability, which may vary by analyte. Patient Instructions:     Diet, activity, wound care: See printed instructions. I spent 10 minutes discharging the patient in face to face contact. Follow-up Appointments   Procedures    FOLLOW UP VISIT Appointment in: One Week     Standing Status:   Standing     Number of Occurrences:   1     Order Specific Question:   Appointment in     Answer:    One Week        Signed By:  Janet Nelson MD     August 6, 2021

## 2021-08-06 NOTE — PROGRESS NOTES
I have reviewed discharge instructions with the patient. The patient verbalized understanding. Discharge medications reviewed with patient and appropriate educational materials and side effects teaching were provided. Updated AVS to reflect most recent narcotic administration.

## 2021-08-06 NOTE — PROGRESS NOTES
Gynecology Progress Note    Admission day 2. No new complaints. Pain controlled on current medication. Admission symptoms are better. Vitals:  Blood pressure 102/65, pulse 60, temperature 97.9 °F (36.6 °C), resp. rate 16, height 5' 5\" (1.651 m), weight 210 lb (95.3 kg), SpO2 98 %. Temp (24hrs), Av.8 °F (36.6 °C), Min:97.6 °F (36.4 °C), Max:98 °F (36.7 °C)      I and O: OK    Exam:  Patient without distress. Abdomen soft,  nontender. Lower extremities are negative for swelling, cords, or tenderness. Lab results:   Recent Results (from the past 24 hour(s))   CBC WITH AUTOMATED DIFF    Collection Time: 21  4:00 AM   Result Value Ref Range    WBC 6.0 3.6 - 11.0 K/uL    RBC 4.24 3.80 - 5.20 M/uL    HGB 13.1 11.5 - 16.0 g/dL    HCT 41.1 35.0 - 47.0 %    MCV 96.9 80.0 - 99.0 FL    MCH 30.9 26.0 - 34.0 PG    MCHC 31.9 30.0 - 36.5 g/dL    RDW 12.5 11.5 - 14.5 %    PLATELET 090 087 - 727 K/uL    MPV 11.5 8.9 - 12.9 FL    NRBC 0.0 0  WBC    ABSOLUTE NRBC 0.00 0.00 - 0.01 K/uL    NEUTROPHILS 48 32 - 75 %    LYMPHOCYTES 38 12 - 49 %    MONOCYTES 9 5 - 13 %    EOSINOPHILS 4 0 - 7 %    BASOPHILS 1 0 - 1 %    IMMATURE GRANULOCYTES 0 0.0 - 0.5 %    ABS. NEUTROPHILS 2.9 1.8 - 8.0 K/UL    ABS. LYMPHOCYTES 2.3 0.8 - 3.5 K/UL    ABS. MONOCYTES 0.5 0.0 - 1.0 K/UL    ABS. EOSINOPHILS 0.3 0.0 - 0.4 K/UL    ABS. BASOPHILS 0.0 0.0 - 0.1 K/UL    ABS. IMM. GRANS. 0.0 0.00 - 0.04 K/UL    DF AUTOMATED     SAMPLES BEING HELD    Collection Time: 08/06/21  8:23 AM   Result Value Ref Range    SAMPLES BEING HELD 1UC     COMMENT        Add-on orders for these samples will be processed based on acceptable specimen integrity and analyte stability, which may vary by analyte. Assessment and Plan:   Patient stable. WBC even lower. With no hematuria and no colicky unilateral pain, this is not renal stone. Patient has no further benefit to continued hospitalization.   Home today with pain med and antibiotics.

## 2021-08-06 NOTE — PROGRESS NOTES
[facility]   Follow-up Consult Note    Date: 8/6/2021    Assessment/Plan:     Pancho Shelby is a 27 y.o. female with h/o migraines, h/o chlamydia, obesity. The [facility] team was consulted for further evaluation of lower abdominal pain. 24 Hour overnight event: No acute event overnight. Lower abd pain: DDx includes infectious process vs. multicystic ovaries vs. kidney stone vs. Chronic pelvic syndrome. Less likely infectious given CT A/P is negative for acute process and no leukocytosis. POA UPT neg. Transvaginal ultrasound also c/w numerous follicles in both ovaries. Ucx: NG. CRP neg. - Repeat UA today. If present for hematuria, will obtain CT w/o cont to evaluate for kidney stones. - Follow up Chlamydia and GC   - Continue CTX, Doxy and Flagyl.      BV: Clue cells on wet prep  - Continue Flagyl     Irregular menstrual bleeding: per Ob-gyn     Right hepatic cyst vs unspecified lesion: incidental on CT A/P   - F/u outpatient liver mass protocol MRI     Severe Obesity: Body mass index is 34.95 kg/m². - Continue counseling lifestyle modifications     Pain Management: Per Ob-gyn     DVT Prophylaxis: Per Ob-gyn     Disposition: Per Ob-gyn    Thank you very much for allowing us to participate in the care of this pleasant patient. The family medicine service will continue to follow the patient's medical progress along with you. Please do not hesitate to page with any questions or to discuss the case (pager# [phone]). Patient will be discussed with Dr. Fred Sanchez MD  Family Medicine Resident         CC: Lower abdominal pain. Subjective  No acute events overnight. Patient was evaluated at bedside. Only endorses lower abdominal pain 6-7/10 in severity. Sharp pain that moves to the RLQ and the lower back. Not associated with vaginal discharge.      Inpatient Medications  Current Facility-Administered Medications   Medication Dose Route Frequency    prochlorperazine (COMPAZINE) with saline injection 5 mg  5 mg IntraVENous Q4H PRN    HYDROmorphone (DILAUDID) injection 1 mg  1 mg IntraVENous Q3H PRN    doxycycline (VIBRA-TABS) tablet 100 mg  100 mg Oral BID WITH MEALS    lactated Ringers infusion  125 mL/hr IntraVENous CONTINUOUS    metroNIDAZOLE (FLAGYL) tablet 500 mg  500 mg Oral BID WITH MEALS    ondansetron (ZOFRAN) injection 4 mg  4 mg IntraVENous Q6H PRN    acetaminophen (TYLENOL) tablet 650 mg  650 mg Oral Q4H PRN    ketorolac (TORADOL) injection 30 mg  30 mg IntraVENous Q6H    oxyCODONE IR (ROXICODONE) tablet 5 mg  5 mg Oral Q4H PRN    oxyCODONE IR (ROXICODONE) tablet 10 mg  10 mg Oral Q4H PRN    albuterol (PROVENTIL VENTOLIN) nebulizer solution 2.5 mg  2.5 mg Nebulization Q4H PRN    cefTRIAXone (ROCEPHIN) 1 g in sterile water (preservative free) 10 mL IV syringe  1 g IntraVENous Q12H         Allergies  Allergies   Allergen Reactions    Chicken Flavor Itching    Other Medication Hives     Ham         Objective  Vitals:  Patient Vitals for the past 8 hrs:   Temp Pulse Resp BP SpO2   08/06/21 0411 97.6 °F (36.4 °C) 78 16 104/72 98 %         I/O:    Intake/Output Summary (Last 24 hours) at 8/6/2021 0814  Last data filed at 8/5/2021 2105  Gross per 24 hour   Intake 1320 ml   Output    Net 1320 ml     Last shift:    No intake/output data recorded. Last 3 shifts:    08/04 1901 - 08/06 0700  In: 1620 [P.O.:1610; I.V.:10]  Out: -     Physical Exam:  General: No acute distress. Alert. Cooperative. Head: Normocephalic. Atraumatic. Eyes:  Conjunctiva pink. Sclera white. PERRL. Nose:  Septum midline. Mucosa pink. No drainage. Throat: Mucosa pink. Moist mucous membranes. No tonsillar exudates or erythema. Palate movement equal bilaterally. Respiratory: CTAB. No w/r/r/c.   Cardiovascular: RRR. Normal S1,S2. No m/r/g. Pulses 2+ throughout. GI: + bowel sounds. Nontender. No rebound tenderness or guarding.  Nondistended Extremities: No edema. No palpable cord. No tenderness. Laboratory Data  Recent Results (from the past 12 hour(s))   CBC WITH AUTOMATED DIFF    Collection Time: 08/06/21  4:00 AM   Result Value Ref Range    WBC 6.0 3.6 - 11.0 K/uL    RBC 4.24 3.80 - 5.20 M/uL    HGB 13.1 11.5 - 16.0 g/dL    HCT 41.1 35.0 - 47.0 %    MCV 96.9 80.0 - 99.0 FL    MCH 30.9 26.0 - 34.0 PG    MCHC 31.9 30.0 - 36.5 g/dL    RDW 12.5 11.5 - 14.5 %    PLATELET 517 360 - 617 K/uL    MPV 11.5 8.9 - 12.9 FL    NRBC 0.0 0  WBC    ABSOLUTE NRBC 0.00 0.00 - 0.01 K/uL    NEUTROPHILS 48 32 - 75 %    LYMPHOCYTES 38 12 - 49 %    MONOCYTES 9 5 - 13 %    EOSINOPHILS 4 0 - 7 %    BASOPHILS 1 0 - 1 %    IMMATURE GRANULOCYTES 0 0.0 - 0.5 %    ABS. NEUTROPHILS 2.9 1.8 - 8.0 K/UL    ABS. LYMPHOCYTES 2.3 0.8 - 3.5 K/UL    ABS. MONOCYTES 0.5 0.0 - 1.0 K/UL    ABS. EOSINOPHILS 0.3 0.0 - 0.4 K/UL    ABS. BASOPHILS 0.0 0.0 - 0.1 K/UL    ABS. IMM. GRANS. 0.0 0.00 - 0.04 K/UL    DF AUTOMATED       Imaging  CT A/P (08/03/2021)    EXAM: CT ABD PELV W CONT     INDICATION: severe persistent pelvic pain      COMPARISON: Pelvic ultrasound 8/3/2021, CT abdomen and pelvis 4/18/2017      CONTRAST: 100 mL of Isovue-370.     TECHNIQUE:   Following the uneventful intravenous administration of contrast, thin axial  images were obtained through the abdomen and pelvis. Coronal and sagittal  reconstructions were generated. Oral contrast was not administered. CT dose  reduction was achieved through use of a standardized protocol tailored for this  examination and automatic exposure control for dose modulation.     FINDINGS:   LOWER THORAX: No significant abnormality in the incidentally imaged lower chest.  LIVER: 3.5 cm fluid attenuation lesion within versus along the capsule of the  posterior right hepatic lobe, not present on 4/18/2017 CT. BILIARY TREE: Gallbladder is within normal limits. CBD is not dilated. SPLEEN: within normal limits.   PANCREAS: No mass or ductal dilatation. ADRENALS: Unremarkable. KIDNEYS: No mass, calculus, or hydronephrosis. Several subcentimeter  hypodensities bilaterally, too small to characterize. No dedicated follow-up  recommended  STOMACH: Unremarkable. SMALL BOWEL: No dilatation or wall thickening. COLON: No dilatation or wall thickening. APPENDIX: Normal  PERITONEUM: No ascites or pneumoperitoneum. RETROPERITONEUM: No lymphadenopathy or aortic aneurysm. REPRODUCTIVE ORGANS: IUD. Enlarged multicystic appearing ovaries, better  characterized on prior pelvic ultrasound, again correlate for polycystic ovarian  syndrome. URINARY BLADDER: No mass or calculus. BONES: No destructive bone lesion. ABDOMINAL WALL: No mass or hernia. ADDITIONAL COMMENTS: N/A     IMPRESSION  1. Enlarged multicystic appearing ovaries, better characterized on prior pelvic  ultrasound, again correlate for polycystic ovarian syndrome. 2.  Normal appendix. 3.  Fluid attenuation lesion within versus along the capsule of the posterior  right hepatic lobe, not present on 4/18/2017 CT. This may represent a hepatic  cyst versus an otherwise unspecified cystic lesion. Dedicated nonemergent liver  mass protocol MRI recommended on an outpatient basis for further  characterization.       [facility] (08/03/2021)    INDICATION:  dub. Additional history: Abdominal pain. Vaginal bleeding every week for 2-4 days x4  weeks. IUD x3 years. Pain radiates to the back. Vomiting, fatigue, dizziness and  headache since yesterday. COMPARISON:  None. .  TECHNIQUE:  Real-time sonography of the pelvis was performed using the urine filled bladder  as an acoustic window. Multiple static images of the uterus and ovaries were  obtained. Transvaginal sonography was performed with multiple static images of  the uterus and ovaries obtained. .  TRANS ABDOMINAL FINDINGS:  The uterus measures 7.6 x 6.1 x 4.6 cm. The endometrial stripe measures 1 cm.   The right ovary measures 4 x 2.7 x 3.2 cm. The left ovary measures 3.3 x 2.5 x  3.1 cm. There is blood flow to both ovaries. There is no visualized mass or  fluid in the pelvic cul-de-sac. Margaret Somerset TRANS VAGINAL FINDINGS:   The endometrial stripe measures 0.3 cm. There is an IUD within the endometrial  canal The uterus measures approximately 8.1 x 3.4 x 5.2 cm. The right ovary  measures 3.7 x 2.6 x 2.5 cm and the left ovary measures 4.5 x 2.7 x 3.1 cm. There are numerous follicles in both ovaries. There is blood flow to both  ovaries. There is no visualized fluid or mass in the pelvic cul-de-sac. Margaret Somerset IMPRESSION  1. There is an IUD in the endometrial canal.  2. There are numerous follicles in both ovaries, recommend clinical correlation  for polycystic ovarian syndrome.     Hospital Problems  Hospital Problems  Date Reviewed: 8/3/2021        Codes Class Noted POA    Acute female pelvic inflammatory disease ICD-10-CM: N73.0  ICD-9-CM: 614.3  8/3/2021 Yes        Abdominal pain, right lower quadrant ICD-10-CM: R10.31  ICD-9-CM: 789.03  8/3/2021 Yes        Abdominal pain, left lower quadrant ICD-10-CM: R10.32  ICD-9-CM: 789.04  8/3/2021 Yes        Irregular intermenstrual bleeding ICD-10-CM: N92.1  ICD-9-CM: 626.6  8/3/2021 Yes        Bacterial vaginosis ICD-10-CM: N76.0, B96.89  ICD-9-CM: 616.10, 041.9  8/3/2021 Yes

## 2021-08-06 NOTE — PROGRESS NOTES
Problem: Falls - Risk of  Goal: *Absence of Falls  Description: Document Mary Alvarez Fall Risk and appropriate interventions in the flowsheet.   Outcome: Progressing Towards Goal  Note: Fall Risk Interventions:  Mobility Interventions: Patient to call before getting OOB         Medication Interventions: Teach patient to arise slowly                   Problem: Patient Education: Go to Patient Education Activity  Goal: Patient/Family Education  Outcome: Progressing Towards Goal     Problem: Pain  Goal: *Control of Pain  Outcome: Progressing Towards Goal  Goal: *PALLIATIVE CARE:  Alleviation of Pain  Outcome: Progressing Towards Goal     Problem: Patient Education: Go to Patient Education Activity  Goal: Patient/Family Education  Outcome: Progressing Towards Goal

## 2021-08-06 NOTE — TELEPHONE ENCOUNTER
Returned call to hospital, no answer no Voicemail to leave message. Also called pt, no answer, [address] per below for hosp fuv      ----- Message from [place] sent at 8/6/2021  9:14 AM EDT -----  Regarding: Dr. Hugh Hilario  Appointment not available    Caller's first and last name and relationship to patient (if not the patient): Jovon tovar/ [facility]   ([phone])      Best contact number:[phone]      Preferred date and time:  first available      Scheduled appointment date and time:  n/a      Reason for appointment:  [facility] Follow Up -  pt will be discharged 8/6/21 for acute female pelvic inflammatory disease      Details to clarify the request:  Provider has no availability. Please call patient to schedule appt.        [place]
